# Patient Record
Sex: FEMALE | Race: OTHER | NOT HISPANIC OR LATINO
[De-identification: names, ages, dates, MRNs, and addresses within clinical notes are randomized per-mention and may not be internally consistent; named-entity substitution may affect disease eponyms.]

---

## 2017-01-03 VITALS — WEIGHT: 94 LBS | BODY MASS INDEX: 22.39 KG/M2

## 2017-01-03 RX ORDER — METHYLPREDNISOLONE 4 MG/1
4 TABLET ORAL
Qty: 1 | Refills: 0 | Status: ACTIVE | COMMUNITY
Start: 2017-01-03 | End: 1900-01-01

## 2017-01-04 ENCOUNTER — OTHER (OUTPATIENT)
Age: 82
End: 2017-01-04

## 2017-01-10 ENCOUNTER — OTHER (OUTPATIENT)
Age: 82
End: 2017-01-10

## 2017-01-10 VITALS
OXYGEN SATURATION: 90 % | RESPIRATION RATE: 16 BRPM | WEIGHT: 90.28 LBS | SYSTOLIC BLOOD PRESSURE: 109 MMHG | BODY MASS INDEX: 21.5 KG/M2 | DIASTOLIC BLOOD PRESSURE: 50 MMHG | HEART RATE: 100 BPM

## 2017-01-10 DIAGNOSIS — B37.0 CANDIDAL STOMATITIS: ICD-10-CM

## 2017-01-10 RX ORDER — FLUCONAZOLE 100 MG/1
100 TABLET ORAL
Qty: 15 | Refills: 0 | Status: ACTIVE | COMMUNITY
Start: 2017-01-10 | End: 1900-01-01

## 2017-01-11 LAB
ALBUMIN SERPL ELPH-MCNC: 3.7 G/DL
ALP BLD-CCNC: 85 U/L
ALT SERPL-CCNC: 23 U/L
ANION GAP SERPL CALC-SCNC: 16 MMOL/L
AST SERPL-CCNC: 17 U/L
BASOPHILS # BLD AUTO: 0.01 K/UL
BASOPHILS NFR BLD AUTO: 0.1 %
BILIRUB SERPL-MCNC: 0.6 MG/DL
BUN SERPL-MCNC: 30 MG/DL
CALCIUM SERPL-MCNC: 9.3 MG/DL
CHLORIDE SERPL-SCNC: 95 MMOL/L
CO2 SERPL-SCNC: 31 MMOL/L
CREAT SERPL-MCNC: 0.8 MG/DL
EOSINOPHIL # BLD AUTO: 0.01 K/UL
EOSINOPHIL NFR BLD AUTO: 0.1 %
GLUCOSE SERPL-MCNC: 140 MG/DL
HCT VFR BLD CALC: 49.1 %
HGB BLD-MCNC: 15.9 G/DL
IMM GRANULOCYTES NFR BLD AUTO: 0.3 %
LYMPHOCYTES # BLD AUTO: 0.71 K/UL
LYMPHOCYTES NFR BLD AUTO: 4.7 %
MAN DIFF?: NORMAL
MCHC RBC-ENTMCNC: 30.3 PG
MCHC RBC-ENTMCNC: 32.4 GM/DL
MCV RBC AUTO: 93.5 FL
MONOCYTES # BLD AUTO: 0.61 K/UL
MONOCYTES NFR BLD AUTO: 4 %
NEUTROPHILS # BLD AUTO: 13.8 K/UL
NEUTROPHILS NFR BLD AUTO: 90.8 %
PLATELET # BLD AUTO: 340 K/UL
POTASSIUM SERPL-SCNC: 3 MMOL/L
PROT SERPL-MCNC: 7 G/DL
RBC # BLD: 5.25 M/UL
RBC # FLD: 13.2 %
SODIUM SERPL-SCNC: 142 MMOL/L
WBC # FLD AUTO: 15.19 K/UL

## 2017-01-18 ENCOUNTER — INPATIENT (INPATIENT)
Facility: HOSPITAL | Age: 82
LOS: 7 days | Discharge: NURSING, REHAB OR LONG-TERM CARE | End: 2017-01-26
Attending: INTERNAL MEDICINE | Admitting: INTERNAL MEDICINE
Payer: MEDICARE

## 2017-01-18 VITALS
RESPIRATION RATE: 16 BRPM | SYSTOLIC BLOOD PRESSURE: 85 MMHG | DIASTOLIC BLOOD PRESSURE: 55 MMHG | HEART RATE: 64 BPM | TEMPERATURE: 98 F

## 2017-01-18 VITALS — BODY MASS INDEX: 21.58 KG/M2 | WEIGHT: 90.61 LBS

## 2017-01-18 DIAGNOSIS — C41.1 MALIGNANT NEOPLASM OF MANDIBLE: Chronic | ICD-10-CM

## 2017-01-18 DIAGNOSIS — Z98.89 OTHER SPECIFIED POSTPROCEDURAL STATES: Chronic | ICD-10-CM

## 2017-01-18 PROCEDURE — 99285 EMERGENCY DEPT VISIT HI MDM: CPT

## 2017-01-19 DIAGNOSIS — E89.0 POSTPROCEDURAL HYPOTHYROIDISM: Chronic | ICD-10-CM

## 2017-01-19 DIAGNOSIS — Z98.890 OTHER SPECIFIED POSTPROCEDURAL STATES: Chronic | ICD-10-CM

## 2017-01-19 DIAGNOSIS — E78.5 HYPERLIPIDEMIA, UNSPECIFIED: ICD-10-CM

## 2017-01-19 DIAGNOSIS — E87.6 HYPOKALEMIA: ICD-10-CM

## 2017-01-19 DIAGNOSIS — Z29.9 ENCOUNTER FOR PROPHYLACTIC MEASURES, UNSPECIFIED: ICD-10-CM

## 2017-01-19 DIAGNOSIS — C06.9 MALIGNANT NEOPLASM OF MOUTH, UNSPECIFIED: ICD-10-CM

## 2017-01-19 DIAGNOSIS — I48.91 UNSPECIFIED ATRIAL FIBRILLATION: ICD-10-CM

## 2017-01-19 DIAGNOSIS — R62.7 ADULT FAILURE TO THRIVE: ICD-10-CM

## 2017-01-19 DIAGNOSIS — I10 ESSENTIAL (PRIMARY) HYPERTENSION: ICD-10-CM

## 2017-01-19 LAB
ALBUMIN SERPL ELPH-MCNC: 2.5 G/DL — LOW (ref 3.3–5)
ALP SERPL-CCNC: 79 U/L — SIGNIFICANT CHANGE UP (ref 40–120)
ALT FLD-CCNC: 36 U/L — SIGNIFICANT CHANGE UP (ref 12–78)
ANION GAP SERPL CALC-SCNC: 12 MMOL/L — SIGNIFICANT CHANGE UP (ref 5–17)
ANION GAP SERPL CALC-SCNC: 15 MMOL/L — SIGNIFICANT CHANGE UP (ref 5–17)
ANION GAP SERPL CALC-SCNC: 9 MMOL/L — SIGNIFICANT CHANGE UP (ref 5–17)
APPEARANCE UR: CLEAR — SIGNIFICANT CHANGE UP
APTT BLD: 31 SEC — SIGNIFICANT CHANGE UP (ref 27.5–37.4)
AST SERPL-CCNC: 27 U/L — SIGNIFICANT CHANGE UP (ref 15–37)
BACTERIA # UR AUTO: ABNORMAL
BASOPHILS # BLD AUTO: 0 K/UL — SIGNIFICANT CHANGE UP (ref 0–0.2)
BASOPHILS NFR BLD AUTO: 0.5 % — SIGNIFICANT CHANGE UP (ref 0–2)
BILIRUB SERPL-MCNC: 0.6 MG/DL — SIGNIFICANT CHANGE UP (ref 0.2–1.2)
BILIRUB UR-MCNC: ABNORMAL
BUN SERPL-MCNC: 18 MG/DL — SIGNIFICANT CHANGE UP (ref 7–23)
BUN SERPL-MCNC: 26 MG/DL — HIGH (ref 7–23)
BUN SERPL-MCNC: 32 MG/DL — HIGH (ref 7–23)
CALCIUM SERPL-MCNC: 7.1 MG/DL — LOW (ref 8.5–10.1)
CALCIUM SERPL-MCNC: 7.3 MG/DL — LOW (ref 8.5–10.1)
CALCIUM SERPL-MCNC: 7.9 MG/DL — LOW (ref 8.5–10.1)
CHLORIDE SERPL-SCNC: 104 MMOL/L — SIGNIFICANT CHANGE UP (ref 96–108)
CHLORIDE SERPL-SCNC: 105 MMOL/L — SIGNIFICANT CHANGE UP (ref 96–108)
CHLORIDE SERPL-SCNC: 96 MMOL/L — SIGNIFICANT CHANGE UP (ref 96–108)
CK MB BLD-MCNC: 10 % — HIGH (ref 0–3.5)
CK MB CFR SERPL CALC: 6.4 NG/ML — HIGH (ref 0.5–3.6)
CK SERPL-CCNC: 64 U/L — SIGNIFICANT CHANGE UP (ref 26–192)
CO2 SERPL-SCNC: 26 MMOL/L — SIGNIFICANT CHANGE UP (ref 22–31)
CO2 SERPL-SCNC: 26 MMOL/L — SIGNIFICANT CHANGE UP (ref 22–31)
CO2 SERPL-SCNC: 27 MMOL/L — SIGNIFICANT CHANGE UP (ref 22–31)
COLOR SPEC: YELLOW — SIGNIFICANT CHANGE UP
CREAT SERPL-MCNC: 0.98 MG/DL — SIGNIFICANT CHANGE UP (ref 0.5–1.3)
CREAT SERPL-MCNC: 1.25 MG/DL — SIGNIFICANT CHANGE UP (ref 0.5–1.3)
CREAT SERPL-MCNC: 1.77 MG/DL — HIGH (ref 0.5–1.3)
DIFF PNL FLD: NEGATIVE — SIGNIFICANT CHANGE UP
EOSINOPHIL # BLD AUTO: 0 K/UL — SIGNIFICANT CHANGE UP (ref 0–0.5)
EOSINOPHIL NFR BLD AUTO: 0.3 % — SIGNIFICANT CHANGE UP (ref 0–6)
EPI CELLS # UR: SIGNIFICANT CHANGE UP
GLUCOSE SERPL-MCNC: 133 MG/DL — HIGH (ref 70–99)
GLUCOSE SERPL-MCNC: 168 MG/DL — HIGH (ref 70–99)
GLUCOSE SERPL-MCNC: 74 MG/DL — SIGNIFICANT CHANGE UP (ref 70–99)
GLUCOSE UR QL: NEGATIVE MG/DL — SIGNIFICANT CHANGE UP
HCT VFR BLD CALC: 36.9 % — SIGNIFICANT CHANGE UP (ref 34.5–45)
HCT VFR BLD CALC: 40.8 % — SIGNIFICANT CHANGE UP (ref 34.5–45)
HGB BLD-MCNC: 13.2 G/DL — SIGNIFICANT CHANGE UP (ref 11.5–15.5)
HGB BLD-MCNC: 14.6 G/DL — SIGNIFICANT CHANGE UP (ref 11.5–15.5)
HYALINE CASTS # UR AUTO: ABNORMAL /LPF
INR BLD: 1 RATIO — SIGNIFICANT CHANGE UP (ref 0.88–1.16)
KETONES UR-MCNC: ABNORMAL
LACTATE SERPL-SCNC: 1.9 MMOL/L — SIGNIFICANT CHANGE UP (ref 0.7–2)
LEUKOCYTE ESTERASE UR-ACNC: ABNORMAL
LYMPHOCYTES # BLD AUTO: 0.9 K/UL — LOW (ref 1–3.3)
LYMPHOCYTES # BLD AUTO: 9.9 % — LOW (ref 13–44)
MAGNESIUM SERPL-MCNC: 1.4 MG/DL — LOW (ref 1.8–2.4)
MAGNESIUM SERPL-MCNC: 1.8 MG/DL — SIGNIFICANT CHANGE UP (ref 1.8–2.4)
MCHC RBC-ENTMCNC: 30.2 PG — SIGNIFICANT CHANGE UP (ref 27–34)
MCHC RBC-ENTMCNC: 30.7 PG — SIGNIFICANT CHANGE UP (ref 27–34)
MCHC RBC-ENTMCNC: 35.7 GM/DL — SIGNIFICANT CHANGE UP (ref 32–36)
MCHC RBC-ENTMCNC: 35.7 GM/DL — SIGNIFICANT CHANGE UP (ref 32–36)
MCV RBC AUTO: 84.8 FL — SIGNIFICANT CHANGE UP (ref 80–100)
MCV RBC AUTO: 85.9 FL — SIGNIFICANT CHANGE UP (ref 80–100)
MONOCYTES # BLD AUTO: 0.3 K/UL — SIGNIFICANT CHANGE UP (ref 0–0.9)
MONOCYTES NFR BLD AUTO: 3.4 % — SIGNIFICANT CHANGE UP (ref 2–14)
NEUTROPHILS # BLD AUTO: 7.7 K/UL — HIGH (ref 1.8–7.4)
NEUTROPHILS NFR BLD AUTO: 85.9 % — HIGH (ref 43–77)
NITRITE UR-MCNC: NEGATIVE — SIGNIFICANT CHANGE UP
NT-PROBNP SERPL-SCNC: 1128 PG/ML — HIGH (ref 0–450)
PH UR: 5 — SIGNIFICANT CHANGE UP (ref 4.8–8)
PHOSPHATE SERPL-MCNC: 2.8 MG/DL — SIGNIFICANT CHANGE UP (ref 2.5–4.5)
PLATELET # BLD AUTO: 219 K/UL — SIGNIFICANT CHANGE UP (ref 150–400)
PLATELET # BLD AUTO: 261 K/UL — SIGNIFICANT CHANGE UP (ref 150–400)
POTASSIUM SERPL-MCNC: 2.8 MMOL/L — CRITICAL LOW (ref 3.5–5.3)
POTASSIUM SERPL-MCNC: 2.9 MMOL/L — CRITICAL LOW (ref 3.5–5.3)
POTASSIUM SERPL-MCNC: 3.1 MMOL/L — LOW (ref 3.5–5.3)
POTASSIUM SERPL-SCNC: 2.8 MMOL/L — CRITICAL LOW (ref 3.5–5.3)
POTASSIUM SERPL-SCNC: 2.9 MMOL/L — CRITICAL LOW (ref 3.5–5.3)
POTASSIUM SERPL-SCNC: 3.1 MMOL/L — LOW (ref 3.5–5.3)
PROT SERPL-MCNC: 6.1 GM/DL — SIGNIFICANT CHANGE UP (ref 6–8.3)
PROT UR-MCNC: 30 MG/DL
PROTHROM AB SERPL-ACNC: 11.2 SEC — SIGNIFICANT CHANGE UP (ref 10–13.1)
RBC # BLD: 4.29 M/UL — SIGNIFICANT CHANGE UP (ref 3.8–5.2)
RBC # BLD: 4.82 M/UL — SIGNIFICANT CHANGE UP (ref 3.8–5.2)
RBC # FLD: 11.8 % — SIGNIFICANT CHANGE UP (ref 11–15)
RBC # FLD: 11.9 % — SIGNIFICANT CHANGE UP (ref 11–15)
RBC CASTS # UR COMP ASSIST: SIGNIFICANT CHANGE UP /HPF (ref 0–4)
SODIUM SERPL-SCNC: 137 MMOL/L — SIGNIFICANT CHANGE UP (ref 135–145)
SODIUM SERPL-SCNC: 141 MMOL/L — SIGNIFICANT CHANGE UP (ref 135–145)
SODIUM SERPL-SCNC: 142 MMOL/L — SIGNIFICANT CHANGE UP (ref 135–145)
SP GR SPEC: 1.01 — SIGNIFICANT CHANGE UP (ref 1.01–1.02)
TROPONIN I SERPL-MCNC: 0.02 NG/ML — SIGNIFICANT CHANGE UP (ref 0.01–0.04)
UROBILINOGEN FLD QL: 1 MG/DL
WBC # BLD: 8.9 K/UL — SIGNIFICANT CHANGE UP (ref 3.8–10.5)
WBC # BLD: 9.3 K/UL — SIGNIFICANT CHANGE UP (ref 3.8–10.5)
WBC # FLD AUTO: 8.9 K/UL — SIGNIFICANT CHANGE UP (ref 3.8–10.5)
WBC # FLD AUTO: 9.3 K/UL — SIGNIFICANT CHANGE UP (ref 3.8–10.5)
WBC UR QL: SIGNIFICANT CHANGE UP

## 2017-01-19 PROCEDURE — 93970 EXTREMITY STUDY: CPT | Mod: 26

## 2017-01-19 PROCEDURE — 99223 1ST HOSP IP/OBS HIGH 75: CPT

## 2017-01-19 PROCEDURE — 71010: CPT | Mod: 26

## 2017-01-19 RX ORDER — HEPARIN SODIUM 5000 [USP'U]/ML
5000 INJECTION INTRAVENOUS; SUBCUTANEOUS EVERY 12 HOURS
Qty: 0 | Refills: 0 | Status: DISCONTINUED | OUTPATIENT
Start: 2017-01-19 | End: 2017-01-26

## 2017-01-19 RX ORDER — PIPERACILLIN AND TAZOBACTAM 4; .5 G/20ML; G/20ML
3.38 INJECTION, POWDER, LYOPHILIZED, FOR SOLUTION INTRAVENOUS ONCE
Qty: 0 | Refills: 0 | Status: COMPLETED | OUTPATIENT
Start: 2017-01-19 | End: 2017-01-19

## 2017-01-19 RX ORDER — SODIUM CHLORIDE 9 MG/ML
1000 INJECTION INTRAMUSCULAR; INTRAVENOUS; SUBCUTANEOUS
Qty: 0 | Refills: 0 | Status: DISCONTINUED | OUTPATIENT
Start: 2017-01-19 | End: 2017-01-19

## 2017-01-19 RX ORDER — POTASSIUM CHLORIDE 20 MEQ
40 PACKET (EA) ORAL ONCE
Qty: 0 | Refills: 0 | Status: COMPLETED | OUTPATIENT
Start: 2017-01-19 | End: 2017-01-19

## 2017-01-19 RX ORDER — SODIUM CHLORIDE 9 MG/ML
3 INJECTION INTRAMUSCULAR; INTRAVENOUS; SUBCUTANEOUS ONCE
Qty: 0 | Refills: 0 | Status: COMPLETED | OUTPATIENT
Start: 2017-01-19 | End: 2017-01-19

## 2017-01-19 RX ORDER — SODIUM CHLORIDE 9 MG/ML
1000 INJECTION INTRAMUSCULAR; INTRAVENOUS; SUBCUTANEOUS ONCE
Qty: 0 | Refills: 0 | Status: COMPLETED | OUTPATIENT
Start: 2017-01-19 | End: 2017-01-19

## 2017-01-19 RX ORDER — POTASSIUM CHLORIDE 20 MEQ
10 PACKET (EA) ORAL ONCE
Qty: 0 | Refills: 0 | Status: COMPLETED | OUTPATIENT
Start: 2017-01-19 | End: 2017-01-19

## 2017-01-19 RX ORDER — MAGNESIUM SULFATE 500 MG/ML
1 VIAL (ML) INJECTION ONCE
Qty: 0 | Refills: 0 | Status: COMPLETED | OUTPATIENT
Start: 2017-01-19 | End: 2017-01-19

## 2017-01-19 RX ORDER — POTASSIUM CHLORIDE 20 MEQ
40 PACKET (EA) ORAL EVERY 4 HOURS
Qty: 0 | Refills: 0 | Status: DISCONTINUED | OUTPATIENT
Start: 2017-01-19 | End: 2017-01-19

## 2017-01-19 RX ORDER — POTASSIUM CHLORIDE 20 MEQ
40 PACKET (EA) ORAL ONCE
Qty: 0 | Refills: 0 | Status: DISCONTINUED | OUTPATIENT
Start: 2017-01-19 | End: 2017-01-19

## 2017-01-19 RX ORDER — SODIUM CHLORIDE 9 MG/ML
1000 INJECTION, SOLUTION INTRAVENOUS
Qty: 0 | Refills: 0 | Status: DISCONTINUED | OUTPATIENT
Start: 2017-01-19 | End: 2017-01-23

## 2017-01-19 RX ORDER — POTASSIUM CHLORIDE 20 MEQ
10 PACKET (EA) ORAL
Qty: 0 | Refills: 0 | Status: COMPLETED | OUTPATIENT
Start: 2017-01-19 | End: 2017-01-19

## 2017-01-19 RX ADMIN — Medication 100 MILLIEQUIVALENT(S): at 02:09

## 2017-01-19 RX ADMIN — Medication 40 MILLIEQUIVALENT(S): at 22:12

## 2017-01-19 RX ADMIN — PIPERACILLIN AND TAZOBACTAM 200 GRAM(S): 4; .5 INJECTION, POWDER, LYOPHILIZED, FOR SOLUTION INTRAVENOUS at 01:39

## 2017-01-19 RX ADMIN — SODIUM CHLORIDE 1000 MILLILITER(S): 9 INJECTION INTRAMUSCULAR; INTRAVENOUS; SUBCUTANEOUS at 01:38

## 2017-01-19 RX ADMIN — Medication 100 MILLIEQUIVALENT(S): at 15:46

## 2017-01-19 RX ADMIN — Medication 100 MILLIEQUIVALENT(S): at 12:59

## 2017-01-19 RX ADMIN — HEPARIN SODIUM 5000 UNIT(S): 5000 INJECTION INTRAVENOUS; SUBCUTANEOUS at 06:08

## 2017-01-19 RX ADMIN — SODIUM CHLORIDE 1000 MILLILITER(S): 9 INJECTION INTRAMUSCULAR; INTRAVENOUS; SUBCUTANEOUS at 01:15

## 2017-01-19 RX ADMIN — HEPARIN SODIUM 5000 UNIT(S): 5000 INJECTION INTRAVENOUS; SUBCUTANEOUS at 17:33

## 2017-01-19 RX ADMIN — Medication 100 GRAM(S): at 11:27

## 2017-01-19 RX ADMIN — SODIUM CHLORIDE 150 MILLILITER(S): 9 INJECTION INTRAMUSCULAR; INTRAVENOUS; SUBCUTANEOUS at 03:25

## 2017-01-19 RX ADMIN — SODIUM CHLORIDE 100 MILLILITER(S): 9 INJECTION, SOLUTION INTRAVENOUS at 22:13

## 2017-01-19 RX ADMIN — SODIUM CHLORIDE 3 MILLILITER(S): 9 INJECTION INTRAMUSCULAR; INTRAVENOUS; SUBCUTANEOUS at 00:35

## 2017-01-19 NOTE — H&P ADULT. - RS GEN PE MLT RESP DETAILS PC
good air movement/clear to auscultation bilaterally/no chest wall tenderness/airway patent/breath sounds equal/respirations non-labored

## 2017-01-19 NOTE — SWALLOW BEDSIDE ASSESSMENT ADULT - ORAL PHASE
Decreased anterior-posterior movement of the bolus/Delayed oral transit time/Lingual stasis Decreased anterior-posterior movement of the bolus/Delayed oral transit time Delayed oral transit time Decreased anterior-posterior movement of the bolus/Lingual stasis/Delayed oral transit time

## 2017-01-19 NOTE — PHYSICAL THERAPY INITIAL EVALUATION ADULT - MODIFIED CLINICAL TEST OF SENSORY INTEGRATION IN BALANCE TEST
Barthel Index: Feeding Score _10__, Bathing Score __0_, Grooming Score _0__, Dressing Score _0__, Bowels Score _0__, Bladder Score ___,0 Toilet Score _0, Transfers Score 0___, Mobility Score _0__, Stairs Score 0___,     Total Score _10__

## 2017-01-19 NOTE — ED PROVIDER NOTE - CONSTITUTIONAL, MLM
normal... Well appearing, well nourished, awake, alert, oriented to person, place, time/situation and in no apparent distress. ++ dry MM

## 2017-01-19 NOTE — H&P ADULT. - PROBLEM SELECTOR PLAN 1
Admit, D5NS, NPO until after speech and swallow evaluation complete, may need nutrition supplementation or nutrition consult, PT consult

## 2017-01-19 NOTE — SWALLOW BEDSIDE ASSESSMENT ADULT - DIET PRIOR TO ADMI
pt stated "I can drink water", and "I eat tuna fish, but I can't". additionally when the pt was offered the soft solid ( saltine cracker) she stated "the doctor told me not to"

## 2017-01-19 NOTE — SWALLOW BEDSIDE ASSESSMENT ADULT - SWALLOW EVAL: PATIENT/FAMILY GOALS STATEMENT
pt stated "I can't eat, I can't taste anything" pt stated "I can't eat, I can't taste anything" and "I lost a lot of weight".

## 2017-01-19 NOTE — DIETITIAN INITIAL EVALUATION ADULT. - NS AS NUTRI INTERV ENTERAL NUTRITION
Insert enteral feeding tube/Consider NGT or PEG for nutrition intervention start Jevity 1.2 @ 30ml/hr to goal rate 45ml/rb=[id number] & 60gm protein/Concentration/Rate/Volume/Route/Composition

## 2017-01-19 NOTE — ED PROVIDER NOTE - OBJECTIVE STATEMENT
89yo female with pmh HTN, HL, oral cancer s/p radiation today, presents with dec appetite for a while and inc weakness. Today pt states inc weakness and dizzy.  Pt also in MRN [id number]    No fever/chills. No photophobia/eye pain/changes in vision, No ear pain/sore throat/dysphagia, No chest pain/palpitations. No SOB/cough/stridor. No abdominal pain, N/V/D, no black/bloody bm. No dysuria/frequency/discharge, No headache. + Dizziness.  No rash.  No numbness/tingling/weakness. 89yo female with pmh HTN, HL, oral cancer s/p radiation today, presents with dec appetite for a while and inc weakness. denies cp. pt completed 5 courses of radiation. Today pt states inc weakness and dizzy.  Pt also in MRN [id number]. granddaughter: [phone]    No fever/chills. No photophobia/eye pain/changes in vision, No ear pain/sore throat/dysphagia, No chest pain/palpitations. No SOB/cough/stridor. No abdominal pain, N/V/D, no black/bloody bm. No dysuria/frequency/discharge, No headache. + Dizziness.  No rash.  No numbness/tingling/weakness.

## 2017-01-19 NOTE — SWALLOW BEDSIDE ASSESSMENT ADULT - SLP PERTINENT HISTORY OF CURRENT PROBLEM
89 year old female with PMH of oral cancer s/p radiation therapy yesterday (has received 5 treatments to date), HTN, HLD and PSH of oral surgery 2/2 to cancer, partial thyroidectomy, and right leg surgery presents to ED complaining of weakness and decreased appetitie. Patient states that due to the radiation she no longer can taste any of her food so she eats very little. She states that she wants nutrition through her veins, she does not want to eat and does not was a NGT for feeding. Admits to 1 episode of diarrhea 2 days ago. Denies fever, chills, sob, chest pain, palpitations, nausea, vomiting, and dysuria. Admits to using a walker for ambulation at home. Patient states she has a home health aid 7 days a week for 6 hours a day, but she lives alone. Patient does not have a list of her medications with her, and is unsure if she has had Afib in the past.

## 2017-01-19 NOTE — ED PROVIDER NOTE - MEDICAL DECISION MAKING DETAILS
Pt initially hypotensive and afebrile, however rectal temp is wnl and BP normalise. potassium replensihed. d/w dr. malhotra for admission.

## 2017-01-19 NOTE — ED ADULT NURSE NOTE - OBJECTIVE STATEMENT
Pt aox3, presented for weakness. Pt denies chest pain, and sob. Pt had radiation therapy yesterday. Pmhx  is mouth cancer

## 2017-01-19 NOTE — ED PROVIDER NOTE - CARE PLAN
Principal Discharge DX:	Hypotensive episode  Secondary Diagnosis:	Hypokalemia  Secondary Diagnosis:	Failure to thrive in adult Principal Discharge DX:	Failure to thrive in adult  Secondary Diagnosis:	Hypokalemia

## 2017-01-19 NOTE — SWALLOW BEDSIDE ASSESSMENT ADULT - SLP GENERAL OBSERVATIONS
pt seen bedside alert and oriented x4. pt responded to questions, verbalized wants and needs and she was able to follow directions. neighbor at the bedside.

## 2017-01-19 NOTE — DIETITIAN INITIAL EVALUATION ADULT. - OTHER INFO
Pt seen for Failure to thrive.  Pt  lives alone with HHA 6 hours daily.  Pt's neighbor does food shopping. Pt states she is unable to tolerate Ensure + or Boost due to causing diarrhea.  Pt with difficulty consuming po diet to "no taste" & increased difficulty chewing & swallowing x 3 weeks. Pt with mouth cancer continues radiation & presents with increased weakness due to unable to eat.  UBW not available.

## 2017-01-19 NOTE — SWALLOW BEDSIDE ASSESSMENT ADULT - COMMENTS
CXR 1/19/17 MPRESSION:Fibrotic changes both lungs. Of note is an intrathoracic stomach versus marked elevation left hemidiaphragm. Left lung volume loss.    pt's Therapeutic Radiologist Dr Bolivar-[phone] CXR 1/19/17 IMPRESSION:Fibrotic changes both lungs. Of note is an intrathoracic stomach versus marked elevation left hemidiaphragm. Left lung volume loss.    pt's Therapeutic Radiologist Dr Bolivar at [facility]/ [place]- [phone]

## 2017-01-19 NOTE — SWALLOW BEDSIDE ASSESSMENT ADULT - SWALLOW EVAL: DIAGNOSIS
pt presented with oropharyngeal dysphagia marked by reduced labial seal causing anterior loss, overall decreased ROM of tongue/jaw/lips causing reduced mastication, slow oral transport, and reduced bolus formation, suspect timely swallow with adequate laryngeal elevation. no overt signs of aspiration

## 2017-01-19 NOTE — DIETITIAN INITIAL EVALUATION ADULT. - PERTINENT LABORATORY DATA
01-19 Na 142 mmol/L Glu 74 mg/dL K+ 2.9 mmol/L<LL> Cr  1.25 mg/dL BUN 26 mg/dL<H> Phos 2.8 mg/dL Alb 2.5(1/19)   PAB n/a   Hgb 13.2 g/dL Hct 36.9 %

## 2017-01-19 NOTE — H&P ADULT. - PROBLEM SELECTOR PLAN 6
heparin, Venous duplex of lower extremities due to swelling and tenderness most likely due to fluid overload

## 2017-01-19 NOTE — DIETITIAN INITIAL EVALUATION ADULT. - NS AS NUTRI INTERV COLLABORAT
Collaboration with other providers/Swallow evaluation pending to assess need to consume po Recommend MD order 3 day calorie count/Collaboration with other providers

## 2017-01-19 NOTE — CHART NOTE - NSCHARTNOTEFT_GEN_A_CORE
Upon Nutritional Assessment by the Registered Dietitian your patient was determined to meet criteria / has evidence of the following diagnosis/diagnoses:          [ ]  Mild Protein Calorie Malnutrition        [ ]  Moderate Protein Calorie Malnutrition        [x ] Severe Protein Calorie Malnutrition        [ ] Unspecified Protein Calorie Malnutrition        [ ] Underweight / BMI <19        [ ] Morbid Obesity / BMI > 40      Findings as based on:  •  Comprehensive nutrition assessment and consultation  •  Calorie counts (nutrient intake analysis)  •  Food acceptance and intake status from observations by staff  •  Follow up  •  Patient education  •  Intervention secondary to interdisciplinary rounds  •   concerns      Treatment:    The following diet has been recommended:  Puree/thin liquids/Ensure Clear 3 x day    PROVIDER Section:     By signing this assessment you are acknowledging and agree with the diagnosis/diagnoses assigned by the Registered Dietitian    Comments:

## 2017-01-19 NOTE — CHART NOTE - NSCHARTNOTEFT_GEN_A_CORE
MEDICINE ATTENDING  Pt seen/examined; H&P, labs, imaging reviewed; 89F, HTN, HL, hx oral cancer/resection/radiation s/p radiation tx yesterday, adm w/weakness, anorexia 2/2 difficulty tasting food.  Pt for speech/swallow eval, repletion of electrolytes, nutrition eval.  Diagnoses, management plan discussed at length w/pt; all questions answered. MEDICINE ATTENDING  Pt seen/examined; H&P, labs, imaging reviewed; 89F, HTN, HL, hx oral cancer/resection/radiation s/p radiation tx yesterday, adm w/weakness, anorexia 2/2 difficulty tasting food.  Pt for speech/swallow eval, repletion of electrolytes, nutrition eval/calorie count, possible PEG?.  Diagnoses, management plan discussed at length w/pt; all questions answered.

## 2017-01-19 NOTE — DIETITIAN INITIAL EVALUATION ADULT. - PHYSICAL APPEARANCE
BMI=22.6; no edema; Nutrition focused physical exam conducted ; found signs of malnutrition [ ]absent [x ]present.  Subcutaneous fat loss: [moderate ] Orbital fat pads region, [WNL ]Buccal fat region, [moderate ]Triceps region,  [moderate ]Ribs region.  Muscle wasting: [moderate ]Temples region, [WNL ]Clavicle region, [moderate ]Shoulder region, [unable ]Scapula region, [moderate ]Interosseous region,  [moderate ]thigh region, [edema ]Calf region/underweight

## 2017-01-19 NOTE — SWALLOW BEDSIDE ASSESSMENT ADULT - ASR SWALLOW LINGUAL MOBILITY
impaired left lateral movement/impaired right lateral movement/impaired protrusion/impaired anterior elevation

## 2017-01-19 NOTE — H&P ADULT. - HISTORY OF PRESENT ILLNESS
89 year old female with PMH of oral cancer s/p radiation therapy yesterday (has received 5 treatments to date), HTN, HLD and PSH of oral surgery 2/2 to cancer, partial thyroidectomy, and right leg surgery presents to ED complaining of weakness and decreased appetitie. Patient states that due to the radiation she no longer can taste any of her food so she eats very little. She states that she wants nutrition through her veins, she does not want to eat and does not was a NGT for feeding. Admits to 1 episode of diarrhea 2 days ago. Denies fever, chills, sob, chest pain, palpitations, nausea, vomiting, and dysuria. Admits to using a walker for ambulation at home. Patient states she has a home health aid 7 days a week for 6 hours a day, but she lives alone. 89 year old female with PMH of oral cancer s/p radiation therapy yesterday (has received 5 treatments to date), HTN, HLD and PSH of oral surgery 2/2 to cancer, partial thyroidectomy, and right leg surgery presents to ED complaining of weakness and decreased appetitie. Patient states that due to the radiation she no longer can taste any of her food so she eats very little. She states that she wants nutrition through her veins, she does not want to eat and does not was a NGT for feeding. Admits to 1 episode of diarrhea 2 days ago. Denies fever, chills, sob, chest pain, palpitations, nausea, vomiting, and dysuria. Admits to using a walker for ambulation at home. Patient states she has a home health aid 7 days a week for 6 hours a day, but she lives alone. Patient does not have a list of her medications with her, and is unsure if she has had Afib in the past.

## 2017-01-19 NOTE — SWALLOW BEDSIDE ASSESSMENT ADULT - ORAL PREPARATORY PHASE
Refuses to accept bolus into oral cavity/Reduced oral grading small bite size-/Decreased mastication ability Anterior loss of bolus/inconsistent small bite size- and pt with munching pattern/Decreased mastication ability Anterior loss of bolus/inconsistent anterior loss

## 2017-01-19 NOTE — DIETITIAN INITIAL EVALUATION ADULT. - NUTRITIONGOAL OUTCOME1
Pt to meet >75% energy & protein via TF or po pending swallow evaluation & maintain stable wt+/-2# Pt to meet >75% meals/supplements & maintain stable wt+/-2#

## 2017-01-19 NOTE — DIETITIAN INITIAL EVALUATION ADULT. - NUTRITION INTERVENTION
Enteral Nutrition/Collaboration and Referral of Nutrition Care Medical Food Supplements/Meals and Snack/Enteral Nutrition Meals and Snack/Collaboration and Referral of Nutrition Care/Enteral Nutrition/Medical Food Supplements/Vitamin

## 2017-01-19 NOTE — H&P ADULT. - PROBLEM SELECTOR PLAN 3
patient currently hypotensive. On IVF, will monitor vital sings. Call PMD to find out home medications

## 2017-01-19 NOTE — SWALLOW BEDSIDE ASSESSMENT ADULT - SPECIFY REASON(S)
pt admitted failure to thrive - diagnosis oral CA. she reported anterior loss, "I chew and chew" and pain in her esophagus while eating "her esophagus is high". pt admitted failure to thrive - diagnosis oral CA. pt reported anterior loss with thin, "I chew and chew" and pain in her esophagus while eating "her esophagus is high".

## 2017-01-19 NOTE — SWALLOW BEDSIDE ASSESSMENT ADULT - SWALLOW EVAL: RECOMMENDED FEEDING/EATING TECHNIQUES
check mouth frequently for oral residue/pocketing/crush medication (when feasible)/small sips/bites/small meals through the day/allow for swallow between intakes

## 2017-01-20 DIAGNOSIS — R13.10 DYSPHAGIA, UNSPECIFIED: ICD-10-CM

## 2017-01-20 DIAGNOSIS — R63.0 ANOREXIA: ICD-10-CM

## 2017-01-20 DIAGNOSIS — E83.42 HYPOMAGNESEMIA: ICD-10-CM

## 2017-01-20 DIAGNOSIS — R33.9 RETENTION OF URINE, UNSPECIFIED: ICD-10-CM

## 2017-01-20 LAB
ANION GAP SERPL CALC-SCNC: 8 MMOL/L — SIGNIFICANT CHANGE UP (ref 5–17)
ANION GAP SERPL CALC-SCNC: 9 MMOL/L — SIGNIFICANT CHANGE UP (ref 5–17)
APPEARANCE UR: CLEAR — SIGNIFICANT CHANGE UP
BILIRUB UR-MCNC: NEGATIVE — SIGNIFICANT CHANGE UP
BUN SERPL-MCNC: 10 MG/DL — SIGNIFICANT CHANGE UP (ref 7–23)
BUN SERPL-MCNC: 12 MG/DL — SIGNIFICANT CHANGE UP (ref 7–23)
CALCIUM SERPL-MCNC: 7.5 MG/DL — LOW (ref 8.5–10.1)
CALCIUM SERPL-MCNC: 7.7 MG/DL — LOW (ref 8.5–10.1)
CHLORIDE SERPL-SCNC: 104 MMOL/L — SIGNIFICANT CHANGE UP (ref 96–108)
CHLORIDE SERPL-SCNC: 108 MMOL/L — SIGNIFICANT CHANGE UP (ref 96–108)
CO2 SERPL-SCNC: 27 MMOL/L — SIGNIFICANT CHANGE UP (ref 22–31)
CO2 SERPL-SCNC: 29 MMOL/L — SIGNIFICANT CHANGE UP (ref 22–31)
COLOR SPEC: YELLOW — SIGNIFICANT CHANGE UP
CREAT SERPL-MCNC: 0.64 MG/DL — SIGNIFICANT CHANGE UP (ref 0.5–1.3)
CREAT SERPL-MCNC: 0.67 MG/DL — SIGNIFICANT CHANGE UP (ref 0.5–1.3)
CULTURE RESULTS: NO GROWTH — SIGNIFICANT CHANGE UP
DIFF PNL FLD: NEGATIVE — SIGNIFICANT CHANGE UP
GLUCOSE SERPL-MCNC: 136 MG/DL — HIGH (ref 70–99)
GLUCOSE SERPL-MCNC: 98 MG/DL — SIGNIFICANT CHANGE UP (ref 70–99)
GLUCOSE UR QL: 50 MG/DL
KETONES UR-MCNC: NEGATIVE — SIGNIFICANT CHANGE UP
LEUKOCYTE ESTERASE UR-ACNC: NEGATIVE — SIGNIFICANT CHANGE UP
MAGNESIUM SERPL-MCNC: 1.5 MG/DL — LOW (ref 1.8–2.4)
NITRITE UR-MCNC: NEGATIVE — SIGNIFICANT CHANGE UP
PH UR: 6 — SIGNIFICANT CHANGE UP (ref 4.8–8)
POTASSIUM SERPL-MCNC: 2.9 MMOL/L — CRITICAL LOW (ref 3.5–5.3)
POTASSIUM SERPL-MCNC: 4.8 MMOL/L — SIGNIFICANT CHANGE UP (ref 3.5–5.3)
POTASSIUM SERPL-SCNC: 2.9 MMOL/L — CRITICAL LOW (ref 3.5–5.3)
POTASSIUM SERPL-SCNC: 4.8 MMOL/L — SIGNIFICANT CHANGE UP (ref 3.5–5.3)
PROT UR-MCNC: NEGATIVE MG/DL — SIGNIFICANT CHANGE UP
SODIUM SERPL-SCNC: 142 MMOL/L — SIGNIFICANT CHANGE UP (ref 135–145)
SODIUM SERPL-SCNC: 143 MMOL/L — SIGNIFICANT CHANGE UP (ref 135–145)
SP GR SPEC: 1.01 — SIGNIFICANT CHANGE UP (ref 1.01–1.02)
SPECIMEN SOURCE: SIGNIFICANT CHANGE UP
UROBILINOGEN FLD QL: NEGATIVE MG/DL — SIGNIFICANT CHANGE UP

## 2017-01-20 PROCEDURE — 99233 SBSQ HOSP IP/OBS HIGH 50: CPT

## 2017-01-20 RX ORDER — POTASSIUM CHLORIDE 20 MEQ
40 PACKET (EA) ORAL EVERY 4 HOURS
Qty: 0 | Refills: 0 | Status: DISCONTINUED | OUTPATIENT
Start: 2017-01-20 | End: 2017-01-20

## 2017-01-20 RX ORDER — POTASSIUM CHLORIDE 20 MEQ
10 PACKET (EA) ORAL ONCE
Qty: 0 | Refills: 0 | Status: DISCONTINUED | OUTPATIENT
Start: 2017-01-20 | End: 2017-01-20

## 2017-01-20 RX ORDER — POTASSIUM CHLORIDE 20 MEQ
10 PACKET (EA) ORAL ONCE
Qty: 0 | Refills: 0 | Status: COMPLETED | OUTPATIENT
Start: 2017-01-20 | End: 2017-01-20

## 2017-01-20 RX ORDER — MAGNESIUM SULFATE 500 MG/ML
2 VIAL (ML) INJECTION ONCE
Qty: 0 | Refills: 0 | Status: COMPLETED | OUTPATIENT
Start: 2017-01-20 | End: 2017-01-20

## 2017-01-20 RX ADMIN — Medication 50 GRAM(S): at 08:35

## 2017-01-20 RX ADMIN — Medication 40 MILLIEQUIVALENT(S): at 13:37

## 2017-01-20 RX ADMIN — HEPARIN SODIUM 5000 UNIT(S): 5000 INJECTION INTRAVENOUS; SUBCUTANEOUS at 17:23

## 2017-01-20 RX ADMIN — Medication 100 MILLIEQUIVALENT(S): at 17:20

## 2017-01-20 RX ADMIN — Medication 100 MILLIEQUIVALENT(S): at 11:40

## 2017-01-20 RX ADMIN — HEPARIN SODIUM 5000 UNIT(S): 5000 INJECTION INTRAVENOUS; SUBCUTANEOUS at 05:42

## 2017-01-20 RX ADMIN — SODIUM CHLORIDE 100 MILLILITER(S): 9 INJECTION, SOLUTION INTRAVENOUS at 11:40

## 2017-01-20 RX ADMIN — Medication 40 MILLIEQUIVALENT(S): at 08:34

## 2017-01-20 NOTE — CONSULT NOTE ADULT - CONSULT REASON
acute urinary retention. Pt was unable to void and was voiding every 10-15 minutes, necessitating Lyons Catheter

## 2017-01-20 NOTE — CONSULT NOTE ADULT - SUBJECTIVE AND OBJECTIVE BOX
HPI:  89 year old female with PMH of oral cancer s/p radiation therapy yesterday (has received 5 treatments to date), HTN, HLD and PSH of oral surgery 2/2 to cancer, partial thyroidectomy, and right leg surgery presents to ED complaining of weakness and decreased appetitie. Patient states that due to the radiation she no longer can taste any of her food so she eats very little. She states that she wants nutrition through her veins, she does not want to eat and does not was a NGT for feeding. Admits to 1 episode of diarrhea 2 days ago. Denies fever, chills, sob, chest pain, palpitations, nausea, vomiting, and dysuria. Admits to using a walker for ambulation at home. Patient states she has a home health aid 7 days a week for 6 hours a day, but she lives alone. Patient does not have a list of her medications with her, and is unsure if she has had Afib in the past. (2017 05:25)      PAST MEDICAL & SURGICAL HISTORY:  Hyperlipidemia  HTN (hypertension)  Mouth cancer  H/O partial thyroidectomy  H/O oral surgery      Allergies    No Known Allergies    Intolerances        SOCIAL HISTORY  Lives alone    FAMILY HISTORY:  No pertinent family history in first degree relatives      MEDICATIONS  (STANDING):  dextrose 5% + sodium chloride 0.9%. 1000milliLiter(s) IV Continuous <Continuous>  heparin  Injectable 5000Unit(s) SubCutaneous every 12 hours  potassium chloride   Powder 40milliEquivalent(s) Oral every 4 hours    MEDICATIONS  (PRN):      ROS:    General:  No wt loss, fevers, chills, night sweats  Eyes:  Good vision, no reported pain  ENT:  No sore throat, pain, runny nose, dysphagia.            no taste of food. radiation for Ca of Mouth  CV:  No pain, palpitations hypo/hypertension  Resp:  No dyspnea, cough, tachypnea, wheezing  GI:  No pain, nausea, vomiting, diarrhea, constipation  :  has Lyons Catheter for AUR  Muscle:  No pain, weakness  Neuro:  No weakness, tingling, memory problems  Psych:  No fatigue, insomnia, mood problems, depression  Endocrine:  No polyuria, polydypsia, cold/heat intolerance  Heme:  No petechiae, ecchymosis, easy bruisability  Skin:  No rash, tattoos, scars, edema      Physical Exam:    Vital Signs:  Vital Signs Last 24 Hrs  T(C): 36.7, Max: 36.7 ( @ 23:16)  T(F): 98, Max: 98 ( @ 23:16)  HR: 91 (68 - 91)  BP: 140/90 (101/55 - 913/66)  BP(mean): --  RR: 17 (15 - 17)  SpO2: 95% (95% - 99%)  Daily     Daily   I&O's Summary      General:  Appears stated age,  well-nourished, no distress  HEENT:  NC/AT, patent nares w/ pink mucosa, OP moist and pink,  PERRL, EOMI, conjunctivae clear, no thyromegaly, nodules, adenopathy, no JVD  Chest:  Full & symmetric excursion, no increased effort.   Cardiovascular:  Regular rhythm, S1, S2,   Abdomen:  Soft, non-tender, non-distended, normoactive bowel sounds.  Genitalia: Lyons catheter in place  Rectal Examination: Deferred.  Extremities:  no edema, pedal pulsation are present, no calf tenderness.  Skin:  No rash/erythema/ecchymoses/petechiae/wounds/abscess/warm/dry  Musculoskeletal:  Full ROM in all joints w/o swelling/tenderness/effusion  Neuro/Psych:  Alert, oriented, normal and grossly intact and symmetrical.      LABS:                        13.2   9.3   )-----------( 219      ( 2017 08:16 )             36.9     2017 07:05    142    |  104    |  12     ----------------------------<  98     2.9     |  29     |  0.67     Ca    7.5        2017 07:05  Phos  2.8       2017 08:16  Mg     1.5       2017 07:05    TPro  6.1    /  Alb  2.5    /  TBili  0.6    /  DBili  x      /  AST  27     /  ALT  36     /  AlkPhos  79     2017 00:41    PT/INR - ( 2017 00:41 )   PT: 11.2 sec;   INR: 1.00 ratio         PTT - ( 2017 00:41 )  PTT:31.0 sec  Urinalysis Basic - ( 2017 11:58 )    Color: Yellow / Appearance: Clear / S.010 / pH: x  Gluc: x / Ketone: Negative  / Bili: Negative / Urobili: Negative mg/dL   Blood: x / Protein: Negative mg/dL / Nitrite: Negative   Leuk Esterase: Negative / RBC: x / WBC x   Sq Epi: x / Non Sq Epi: x / Bacteria: x        RADIOLOGY & ADDITIONAL STUDIES:

## 2017-01-20 NOTE — PROGRESS NOTE ADULT - SUBJECTIVE AND OBJECTIVE BOX
CC/F/U for:    INTERVAL HPI/OVERNIGHT EVENTS:  Pt seen and examined at bedside.     Allergies/Intolerance: No Known Allergies      MEDICATIONS  (STANDING):  dextrose 5% + sodium chloride 0.9%. 1000milliLiter(s) IV Continuous <Continuous>  heparin  Injectable 5000Unit(s) SubCutaneous every 12 hours  potassium chloride   Powder 40milliEquivalent(s) Oral every 4 hours  potassium chloride  10 mEq/100 mL IVPB 10milliEquivalent(s) IV Intermittent once    MEDICATIONS  (PRN):        ROS: all systems reviewed and wnl      PHYSICAL EXAMINATION:  Vital Signs Last 24 Hrs  T(C): 36.1, Max: 36.7 ( @ 11:49)  T(F): 97, Max: 98 ( @ 11:49)  HR: 80 (68 - 80)  BP: 134/66 (101/55 - 134/66)  BP(mean): --  RR: 15 (15 - 18)  SpO2: 98% (95% - 99%)  CAPILLARY BLOOD GLUCOSE      GENERAL: NAD, well-groomed, well-developed  HEAD:  atraumatic, normocephalic  EYES: sclera anicteric  ENMT: mucous membranes moist  NECK: supple, No JVD  CHEST/LUNG: clear to auscultation bilaterally; no rales, rhonchi, or wheezing b/l  HEART: normal S1, S2  ABDOMEN: BS+, soft, ND, NT   EXTREMITIES:  pulses palpable; no clubbing, cyanosis, or edema b/l LEs  NEURO: awake, alert, interactive; moves all extremities  SKIN: no rashes or lesions      LABS:                        13.2   9.3   )-----------( 219      ( 2017 08:16 )             36.9     2017 07:05    142    |  104    |  12     ----------------------------<  98     2.9     |  29     |  0.67     Ca    7.5        2017 07:05  Phos  2.8       2017 08:16  Mg     1.5       2017 07:05    TPro  6.1    /  Alb  2.5    /  TBili  0.6    /  DBili  x      /  AST  27     /  ALT  36     /  AlkPhos  79     2017 00:41    PT/INR - ( 2017 00:41 )   PT: 11.2 sec;   INR: 1.00 ratio         PTT - ( 2017 00:41 )  PTT:31.0 sec  Urinalysis Basic - ( 2017 03:09 )    Color: Yellow / Appearance: Clear / S.015 / pH: x  Gluc: x / Ketone: Trace  / Bili: Small / Urobili: 1 mg/dL   Blood: x / Protein: 30 mg/dL / Nitrite: Negative   Leuk Esterase: Trace / RBC: 0-2 /HPF / WBC 0-2   Sq Epi: x / Non Sq Epi: Occasional / Bacteria: Few          RADIOLOGY & ADDITIONAL TESTS:      ASSESSMENT AND PLAN: CC/F/U for: dysphagia    INTERVAL HPI/OVERNIGHT EVENTS:  Pt seen and examined at bedside. Complains of abd pain, and wants to go to bathroom, but 'nothing's coming out'. Still w/difficulty tasting food. Urinary retention this AM, s/p bladder scan w/584mL and subsequent Lyons placement    Allergies/Intolerance: No Known Allergies      MEDICATIONS  (STANDING):  dextrose 5% + sodium chloride 0.9%. 1000milliLiter(s) IV Continuous <Continuous>  heparin  Injectable 5000Unit(s) SubCutaneous every 12 hours  potassium chloride   Powder 40milliEquivalent(s) Oral every 4 hours  potassium chloride  10 mEq/100 mL IVPB 10milliEquivalent(s) IV Intermittent once    MEDICATIONS  (PRN):        ROS: all systems reviewed and wnl      PHYSICAL EXAMINATION:  Vital Signs Last 24 Hrs  T(C): 36.1, Max: 36.7 ( @ 11:49)  T(F): 97, Max: 98 ( @ 11:49)  HR: 80 (68 - 80)  BP: 134/66 (101/55 - 134/66)  BP(mean): --  RR: 15 (15 - 18)  SpO2: 98% (95% - 99%)  CAPILLARY BLOOD GLUCOSE      GENERAL: NAD, well-groomed, well-developed  HEAD:  atraumatic, normocephalic  EYES: sclera anicteric  ENMT: mucous membranes moist  NECK: supple, No JVD  CHEST/LUNG: clear to auscultation bilaterally; no rales, rhonchi, or wheezing b/l  HEART: normal S1, S2  ABDOMEN: BS+, soft, ND, NT   EXTREMITIES:  pulses palpable; no clubbing, cyanosis, or edema b/l LEs  NEURO: awake, alert, interactive; moves all extremities  SKIN: no rashes or lesions      LABS:                        13.2   9.3   )-----------( 219      ( 2017 08:16 )             36.9     2017 07:05    142    |  104    |  12     ----------------------------<  98     2.9     |  29     |  0.67     Ca    7.5        2017 07:05  Phos  2.8       2017 08:16  Mg     1.5       2017 07:05    TPro  6.1    /  Alb  2.5    /  TBili  0.6    /  DBili  x      /  AST  27     /  ALT  36     /  AlkPhos  79     2017 00:41    PT/INR - ( 2017 00:41 )   PT: 11.2 sec;   INR: 1.00 ratio         PTT - ( 2017 00:41 )  PTT:31.0 sec  Urinalysis Basic - ( 2017 03:09 )    Color: Yellow / Appearance: Clear / S.015 / pH: x  Gluc: x / Ketone: Trace  / Bili: Small / Urobili: 1 mg/dL   Blood: x / Protein: 30 mg/dL / Nitrite: Negative   Leuk Esterase: Trace / RBC: 0-2 /HPF / WBC 0-2   Sq Epi: x / Non Sq Epi: Occasional / Bacteria: Few      RADIOLOGY & ADDITIONAL TESTS:  CXR : IMPRESSION:  Fibrotic changes both lungs. Of note is an intrathoracicstomach versus   marked elevation left hemidiaphragm. Left lung volume loss.    B/L LE dopplers : IMPRESSION: No evidence of deep venous thrombosis in the bilateral lower extremities.    ASSESSMENT AND PLAN:  89F, HTN, HL, hx oral cancer/resection/radiation s/p radiation tx yesterday, adm w/weakness, anorexia 2/2 difficulty tasting food; labs w/marked hypokalemia, hypomagnesemia likely 2/2 malnutrition; speech/swallow shows dysphagia, started on puree diet; undergoing calorie count, likely needs PEG placement for nutritional supplementation; now w/acute urinary retention    GI/LYTES: protein calorie malnutrition, electrolyte imbalance  -caloric supplementation w/ensure  -calorie count underway  -GI consulted; Dr Gandhi to see pt  -replete K, Mg prn; monitor for need to start standing daily supplementation  -continue puree diet; pt still complains of inability to taste food as main difficulty w/eating    : urinary retention, acute -bladder scan showed 584mL urine, Lyons placed,  consulted    CV: essential HTN - at baseline, pt on multidrug antiHTNve regimen; will restart slowly as pt w/poor po intake and at risk for volume depletion    HEME/ONC: hx oral cancer on active radiation therapy; pt to resume txs at discharge    OTHER:   -DVT prophylaxis w/heparin SQ  -dispo dependent on PT eval CC/F/U for: dysphagia    INTERVAL HPI/OVERNIGHT EVENTS:  Pt seen and examined at bedside. Complains of abd pain, and wants to go to bathroom, but 'nothing's coming out'. Still w/difficulty tasting food. Urinary retention this AM, s/p bladder scan w/584mL and subsequent Lyons placement    Allergies/Intolerance: No Known Allergies      MEDICATIONS  (STANDING):  dextrose 5% + sodium chloride 0.9%. 1000milliLiter(s) IV Continuous <Continuous>  heparin  Injectable 5000Unit(s) SubCutaneous every 12 hours  potassium chloride   Powder 40milliEquivalent(s) Oral every 4 hours  potassium chloride  10 mEq/100 mL IVPB 10milliEquivalent(s) IV Intermittent once    MEDICATIONS  (PRN):        ROS: all systems reviewed and wnl      PHYSICAL EXAMINATION:  Vital Signs Last 24 Hrs  T(C): 36.1, Max: 36.7 ( @ 11:49)  T(F): 97, Max: 98 ( @ 11:49)  HR: 80 (68 - 80)  BP: 134/66 (101/55 - 134/66)  BP(mean): --  RR: 15 (15 - 18)  SpO2: 98% (95% - 99%)  CAPILLARY BLOOD GLUCOSE      GENERAL: NAD, well-groomed, well-developed  HEAD:  atraumatic, normocephalic  EYES: sclera anicteric  ENMT: mucous membranes moist  NECK: supple, No JVD  CHEST/LUNG: clear to auscultation bilaterally; no rales, rhonchi, or wheezing b/l  HEART: normal S1, S2  ABDOMEN: BS+, soft, ND, NT   EXTREMITIES:  pulses palpable; no clubbing, cyanosis, or edema b/l LEs  NEURO: awake, alert, interactive; moves all extremities  SKIN: no rashes or lesions      LABS:                        13.2   9.3   )-----------( 219      ( 2017 08:16 )             36.9     2017 07:05    142    |  104    |  12     ----------------------------<  98     2.9     |  29     |  0.67     Ca    7.5        2017 07:05  Phos  2.8       2017 08:16  Mg     1.5       2017 07:05    TPro  6.1    /  Alb  2.5    /  TBili  0.6    /  DBili  x      /  AST  27     /  ALT  36     /  AlkPhos  79     2017 00:41    PT/INR - ( 2017 00:41 )   PT: 11.2 sec;   INR: 1.00 ratio         PTT - ( 2017 00:41 )  PTT:31.0 sec  Urinalysis Basic - ( 2017 03:09 )    Color: Yellow / Appearance: Clear / S.015 / pH: x  Gluc: x / Ketone: Trace  / Bili: Small / Urobili: 1 mg/dL   Blood: x / Protein: 30 mg/dL / Nitrite: Negative   Leuk Esterase: Trace / RBC: 0-2 /HPF / WBC 0-2   Sq Epi: x / Non Sq Epi: Occasional / Bacteria: Few      RADIOLOGY & ADDITIONAL TESTS:  CXR : IMPRESSION:  Fibrotic changes both lungs. Of note is an intrathoracicstomach versus   marked elevation left hemidiaphragm. Left lung volume loss.    B/L LE dopplers : IMPRESSION: No evidence of deep venous thrombosis in the bilateral lower extremities.    ASSESSMENT AND PLAN:  89F, HTN, HL, hx oral cancer/resection/radiation s/p radiation tx yesterday, adm w/weakness, anorexia 2/2 difficulty tasting food; labs w/marked hypokalemia, hypomagnesemia likely 2/2 malnutrition; speech/swallow shows dysphagia, started on puree diet; undergoing calorie count, likely needs PEG placement for nutritional supplementation; now w/acute urinary retention    GI/LYTES: protein calorie malnutrition, electrolyte imbalance  -caloric supplementation w/ensure  -calorie count underway  -GI consulted; Dr Gandhi to see pt  -replete K, Mg prn; monitor for need to start standing daily supplementation  -continue puree diet; pt still complains of inability to taste food as main difficulty w/eating    : urinary retention, acute -bladder scan showed 584mL urine, Lyons placed,  consulted    CV: essential HTN - at baseline, pt on multidrug antiHTNve regimen; will monitor for need to restart as pt currently w/poor po intake and at risk for volume depletion and hypoTN    HEME/ONC: hx oral cancer on active radiation therapy; pt to resume txs at discharge    OTHER:   -DVT prophylaxis w/heparin SQ  -dispo dependent on PT eval

## 2017-01-21 DIAGNOSIS — I10 ESSENTIAL (PRIMARY) HYPERTENSION: ICD-10-CM

## 2017-01-21 LAB
ANION GAP SERPL CALC-SCNC: 7 MMOL/L — SIGNIFICANT CHANGE UP (ref 5–17)
BUN SERPL-MCNC: 8 MG/DL — SIGNIFICANT CHANGE UP (ref 7–23)
CALCIUM SERPL-MCNC: 7.7 MG/DL — LOW (ref 8.5–10.1)
CHLORIDE SERPL-SCNC: 110 MMOL/L — HIGH (ref 96–108)
CO2 SERPL-SCNC: 26 MMOL/L — SIGNIFICANT CHANGE UP (ref 22–31)
CREAT SERPL-MCNC: 0.6 MG/DL — SIGNIFICANT CHANGE UP (ref 0.5–1.3)
CULTURE RESULTS: NO GROWTH — SIGNIFICANT CHANGE UP
GLUCOSE SERPL-MCNC: 146 MG/DL — HIGH (ref 70–99)
HCT VFR BLD CALC: 39.4 % — SIGNIFICANT CHANGE UP (ref 34.5–45)
HGB BLD-MCNC: 13.9 G/DL — SIGNIFICANT CHANGE UP (ref 11.5–15.5)
MCHC RBC-ENTMCNC: 30.6 PG — SIGNIFICANT CHANGE UP (ref 27–34)
MCHC RBC-ENTMCNC: 35.4 GM/DL — SIGNIFICANT CHANGE UP (ref 32–36)
MCV RBC AUTO: 86.7 FL — SIGNIFICANT CHANGE UP (ref 80–100)
PLATELET # BLD AUTO: 196 K/UL — SIGNIFICANT CHANGE UP (ref 150–400)
POTASSIUM SERPL-MCNC: 4.2 MMOL/L — SIGNIFICANT CHANGE UP (ref 3.5–5.3)
POTASSIUM SERPL-SCNC: 4.2 MMOL/L — SIGNIFICANT CHANGE UP (ref 3.5–5.3)
RBC # BLD: 4.54 M/UL — SIGNIFICANT CHANGE UP (ref 3.8–5.2)
RBC # FLD: 12.6 % — SIGNIFICANT CHANGE UP (ref 11–15)
SODIUM SERPL-SCNC: 143 MMOL/L — SIGNIFICANT CHANGE UP (ref 135–145)
SPECIMEN SOURCE: SIGNIFICANT CHANGE UP
WBC # BLD: 6.6 K/UL — SIGNIFICANT CHANGE UP (ref 3.8–10.5)
WBC # FLD AUTO: 6.6 K/UL — SIGNIFICANT CHANGE UP (ref 3.8–10.5)

## 2017-01-21 PROCEDURE — 99233 SBSQ HOSP IP/OBS HIGH 50: CPT

## 2017-01-21 RX ADMIN — HEPARIN SODIUM 5000 UNIT(S): 5000 INJECTION INTRAVENOUS; SUBCUTANEOUS at 06:15

## 2017-01-21 RX ADMIN — HEPARIN SODIUM 5000 UNIT(S): 5000 INJECTION INTRAVENOUS; SUBCUTANEOUS at 19:10

## 2017-01-21 RX ADMIN — Medication 30 MILLILITER(S): at 20:01

## 2017-01-21 NOTE — PROGRESS NOTE ADULT - SUBJECTIVE AND OBJECTIVE BOX
Pt eating a little better - calorie count in progress    MEDICATIONS  (STANDING):  dextrose 5% + sodium chloride 0.9%. 1000milliLiter(s) IV Continuous <Continuous>  heparin  Injectable 5000Unit(s) SubCutaneous every 12 hours    MEDICATIONS  (PRN):      Allergies    No Known Allergies    Intolerances        Vital Signs Last 24 Hrs  T(C): 36.9, Max: 37.1 (01-20 @ 17:53)  T(F): 98.4, Max: 98.8 (01-20 @ 17:53)  HR: 65 (65 - 95)  BP: 119/56 (105/72 - 126/83)  BP(mean): --  RR: 16 (15 - 16)  SpO2: 96% (96% - 98%)    PHYSICAL EXAM:  General: NAD.  CVS: S1, S2  Chest: air entry bilaterally present  Abd: BS present, soft, non-tender      LABS:                        13.9   6.6   )-----------( 196      ( 21 Jan 2017 07:01 )             39.4     21 Jan 2017 07:01    143    |  110    |  8      ----------------------------<  146    4.2     |  26     |  0.60     Ca    7.7        21 Jan 2017 07:01  Mg     1.5       20 Jan 2017 07:05          follow calorie count - in progress

## 2017-01-21 NOTE — PROGRESS NOTE ADULT - SUBJECTIVE AND OBJECTIVE BOX
89y Female admitted with FAILURE TO THRIVE IN ADULT AND HYPOKALEMIA  GENERAL WEAKNESS, WITH NAUSEA  .    Patient seen and examined bedside resting comfortably.  No complaints offered.       T(F): 98.4, Max: 98.4 (01-21 @ 10:05)  HR: 65 (65 - 95)  BP: 119/56 (105/72 - 120/80)  RR: 16 (15 - 16)  SpO2: 96% (96% - 98%)  Wt(kg): --  CAPILLARY BLOOD GLUCOSE      PHYSICAL EXAM:  General: NAD, WDWN.   Neuro:  Alert & oriented x 3  HEENT: NCAT, EOMI, conjunctiva clear  CV: +S1+S2 regular rate and rhythm  Lung: clear to ausculation bilaterally, respirations nonlabored, good inspiratory effort  Abdomen: soft, NTND. Normactive BS  Extremities: no pedal edema or calf tenderness noted   : No CVA or SP tenderness young in SITU with 700ml/ 24hrs deep yellow urine    LABS:                        13.9   6.6   )-----------( 196      ( 21 Jan 2017 07:01 )             39.4     21 Jan 2017 07:01    143    |  110    |  8      ----------------------------<  146    4.2     |  26     |  0.60     Ca    7.7        21 Jan 2017 07:01  Mg     1.5       20 Jan 2017 07:05        I&O's Detail

## 2017-01-21 NOTE — PROGRESS NOTE ADULT - SUBJECTIVE AND OBJECTIVE BOX
CC/F/U for:    INTERVAL HPI/OVERNIGHT EVENTS:  Pt seen and examined at bedside.     Allergies/Intolerance: No Known Allergies      MEDICATIONS  (STANDING):  dextrose 5% + sodium chloride 0.9%. 1000milliLiter(s) IV Continuous <Continuous>  heparin  Injectable 5000Unit(s) SubCutaneous every 12 hours    MEDICATIONS  (PRN):        ROS: all systems reviewed and wnl      PHYSICAL EXAMINATION:  Vital Signs Last 24 Hrs  T(C): 36.9, Max: 37.1 ( @ 17:53)  T(F): 98.4, Max: 98.8 ( @ 17:53)  HR: 65 (65 - 95)  BP: 119/56 (105/72 - 126/83)  BP(mean): --  RR: 16 (15 - 16)  SpO2: 96% (96% - 98%)  CAPILLARY BLOOD GLUCOSE      GENERAL: NAD, well-groomed, well-developed  HEAD:  atraumatic, normocephalic  EYES: sclera anicteric  ENMT: mucous membranes moist  NECK: supple, No JVD  CHEST/LUNG: clear to auscultation bilaterally; no rales, rhonchi, or wheezing b/l  HEART: normal S1, S2  ABDOMEN: BS+, soft, ND, NT   EXTREMITIES:  pulses palpable; no clubbing, cyanosis, or edema b/l LEs  NEURO: awake, alert, interactive; moves all extremities  SKIN: no rashes or lesions      LABS:                        13.9   6.6   )-----------( 196      ( 2017 07:01 )             39.4     2017 07:01    143    |  110    |  8      ----------------------------<  146    4.2     |  26     |  0.60     Ca    7.7        2017 07:01  Mg     1.5       2017 07:05        Urinalysis Basic - ( 2017 11:58 )    Color: Yellow / Appearance: Clear / S.010 / pH: x  Gluc: x / Ketone: Negative  / Bili: Negative / Urobili: Negative mg/dL   Blood: x / Protein: Negative mg/dL / Nitrite: Negative   Leuk Esterase: Negative / RBC: x / WBC x   Sq Epi: x / Non Sq Epi: x / Bacteria: x          RADIOLOGY & ADDITIONAL TESTS:      ASSESSMENT AND PLAN: CC/F/U for: dysphagia    INTERVAL HPI/OVERNIGHT EVENTS:  Pt seen and examined at bedside. Pt upgraded to mechanical soft foods with thin liquids; reports does not like the food. Pt changed to mechanical soft diet by GI on .    Allergies/Intolerance: No Known Allergies      MEDICATIONS  (STANDING):  dextrose 5% + sodium chloride 0.9%. 1000milliLiter(s) IV Continuous <Continuous>  heparin  Injectable 5000Unit(s) SubCutaneous every 12 hours    MEDICATIONS  (PRN):        ROS: all systems reviewed and wnl      PHYSICAL EXAMINATION:  Vital Signs Last 24 Hrs  T(C): 36.9, Max: 37.1 ( @ 17:53)  T(F): 98.4, Max: 98.8 ( @ 17:53)  HR: 65 (65 - 95)  BP: 119/56 (105/72 - 126/83)  BP(mean): --  RR: 16 (15 - 16)  SpO2: 96% (96% - 98%)  CAPILLARY BLOOD GLUCOSE      GENERAL: frail appearing elderly female; NAD  HEAD:  atraumatic, normocephalic  EYES: sclera anicteric  ENMT: mucous membranes moist  NECK: supple, No JVD  CHEST/LUNG: respirations unlabored, air entry symmetric b/l  HEART: normal S1, S2  ABDOMEN: BS+, soft, ND, NT   EXTREMITIES:  trace LE edema b/l LEs  : young in place  NEURO: awake, alert, interactive; moves all extremities  SKIN: slight erythematous coloration to b/l hands, b/l LEs      LABS:                        13.9   6.6   )-----------( 196      ( 2017 07:01 )             39.4     2017 07:01    143    |  110    |  8      ----------------------------<  146    4.2     |  26     |  0.60     Ca    7.7        2017 07:01  Mg     1.5       2017 07:05        Urinalysis Basic - ( 2017 11:58 )    Color: Yellow / Appearance: Clear / S.010 / pH: x  Gluc: x / Ketone: Negative  / Bili: Negative / Urobili: Negative mg/dL   Blood: x / Protein: Negative mg/dL / Nitrite: Negative   Leuk Esterase: Negative / RBC: x / WBC x   Sq Epi: x / Non Sq Epi: x / Bacteria: x          RADIOLOGY & ADDITIONAL TESTS:  CXR : IMPRESSION:  Fibrotic changes both lungs. Of note is an intrathoracicstomach versus   marked elevation left hemidiaphragm. Left lung volume loss.    B/L LE dopplers : IMPRESSION: No evidence of deep venous thrombosis in the bilateral lower extremities.    ASSESSMENT AND PLAN:  89F, HTN, HL, hx oral cancer/resection/radiation s/p radiation tx yesterday, adm w/weakness, anorexia 2/2 difficulty tasting food, failure to thrive; labs w/marked hypokalemia, hypomagnesemia likely 2/2 malnutrition; speech/swallow shows dysphagia, started on puree diet; transitioned to mechanical soft diet by GI; undergoing calorie count to eval need for PEG placement for nutritional supplementation; also w/acute urinary retention s/p Young    GI/LYTES: protein calorie malnutrition, electrolyte imbalance related to poor oral intake from mouth cancer/treatment; undergoing 3-day calorie count to determine need/indication for PEG placement  -caloric supplementation w/ensure  -calorie count underway, ends   -GI consulted; Dr Gandhi following  -electrolytes ok today; monitor for need for supplementation  -currently on mechanical soft diet; pt still complains of inability to taste food as main difficulty w/eating    : urinary retention, acute w/bladder scan showing 584mL on 17, now s/p Young placement; per , likely 2/2 immobilization    -continue Young    CV: essential HTN - at baseline, pt on multidrug antiHTNve regimen, which is currently on hold; will monitor for need to restart as pt currently w/poor po intake and at risk for volume depletion and hypoTN    HEME/ONC: hx oral cancer on active radiation therapy; pt to resume txs at discharge    OTHER:   -DVT prophylaxis w/heparin SQ  -dispo dependent on PT eval

## 2017-01-22 DIAGNOSIS — I48.91 UNSPECIFIED ATRIAL FIBRILLATION: ICD-10-CM

## 2017-01-22 DIAGNOSIS — I95.9 HYPOTENSION, UNSPECIFIED: ICD-10-CM

## 2017-01-22 DIAGNOSIS — I25.9 CHRONIC ISCHEMIC HEART DISEASE, UNSPECIFIED: ICD-10-CM

## 2017-01-22 DIAGNOSIS — E46 UNSPECIFIED PROTEIN-CALORIE MALNUTRITION: ICD-10-CM

## 2017-01-22 DIAGNOSIS — R00.0 TACHYCARDIA, UNSPECIFIED: ICD-10-CM

## 2017-01-22 DIAGNOSIS — R79.89 OTHER SPECIFIED ABNORMAL FINDINGS OF BLOOD CHEMISTRY: ICD-10-CM

## 2017-01-22 LAB
ANION GAP SERPL CALC-SCNC: 10 MMOL/L — SIGNIFICANT CHANGE UP (ref 5–17)
BUN SERPL-MCNC: 12 MG/DL — SIGNIFICANT CHANGE UP (ref 7–23)
CALCIUM SERPL-MCNC: 7.6 MG/DL — LOW (ref 8.5–10.1)
CHLORIDE SERPL-SCNC: 115 MMOL/L — HIGH (ref 96–108)
CK MB BLD-MCNC: 12.6 % — HIGH (ref 0–3.5)
CK MB CFR SERPL CALC: 5.4 NG/ML — HIGH (ref 0.5–3.6)
CK SERPL-CCNC: 40 U/L — SIGNIFICANT CHANGE UP (ref 26–192)
CK SERPL-CCNC: 43 U/L — SIGNIFICANT CHANGE UP (ref 26–192)
CO2 SERPL-SCNC: 22 MMOL/L — SIGNIFICANT CHANGE UP (ref 22–31)
CREAT SERPL-MCNC: 0.7 MG/DL — SIGNIFICANT CHANGE UP (ref 0.5–1.3)
GLUCOSE SERPL-MCNC: 142 MG/DL — HIGH (ref 70–99)
POTASSIUM SERPL-MCNC: 4.2 MMOL/L — SIGNIFICANT CHANGE UP (ref 3.5–5.3)
POTASSIUM SERPL-SCNC: 4.2 MMOL/L — SIGNIFICANT CHANGE UP (ref 3.5–5.3)
SODIUM SERPL-SCNC: 147 MMOL/L — HIGH (ref 135–145)
TROPONIN I SERPL-MCNC: 1 NG/ML — HIGH (ref 0.01–0.04)
TROPONIN I SERPL-MCNC: 1.12 NG/ML — HIGH (ref 0.01–0.04)

## 2017-01-22 PROCEDURE — 99232 SBSQ HOSP IP/OBS MODERATE 35: CPT

## 2017-01-22 PROCEDURE — 99223 1ST HOSP IP/OBS HIGH 75: CPT

## 2017-01-22 RX ORDER — NITROGLYCERIN 6.5 MG
0.4 CAPSULE, EXTENDED RELEASE ORAL
Qty: 0 | Refills: 0 | Status: DISCONTINUED | OUTPATIENT
Start: 2017-01-22 | End: 2017-01-26

## 2017-01-22 RX ORDER — SODIUM CHLORIDE 9 MG/ML
250 INJECTION INTRAMUSCULAR; INTRAVENOUS; SUBCUTANEOUS ONCE
Qty: 0 | Refills: 0 | Status: COMPLETED | OUTPATIENT
Start: 2017-01-22 | End: 2017-01-22

## 2017-01-22 RX ORDER — DIGOXIN 250 MCG
0.12 TABLET ORAL DAILY
Qty: 0 | Refills: 0 | Status: DISCONTINUED | OUTPATIENT
Start: 2017-01-23 | End: 2017-01-26

## 2017-01-22 RX ORDER — ASPIRIN/CALCIUM CARB/MAGNESIUM 324 MG
325 TABLET ORAL DAILY
Qty: 0 | Refills: 0 | Status: DISCONTINUED | OUTPATIENT
Start: 2017-01-22 | End: 2017-01-26

## 2017-01-22 RX ORDER — DIGOXIN 250 MCG
0.25 TABLET ORAL EVERY 8 HOURS
Qty: 0 | Refills: 0 | Status: COMPLETED | OUTPATIENT
Start: 2017-01-22 | End: 2017-01-22

## 2017-01-22 RX ORDER — DILTIAZEM HCL 120 MG
240 CAPSULE, EXT RELEASE 24 HR ORAL DAILY
Qty: 0 | Refills: 0 | Status: DISCONTINUED | OUTPATIENT
Start: 2017-01-22 | End: 2017-01-22

## 2017-01-22 RX ADMIN — Medication 0.25 MILLIGRAM(S): at 15:12

## 2017-01-22 RX ADMIN — HEPARIN SODIUM 5000 UNIT(S): 5000 INJECTION INTRAVENOUS; SUBCUTANEOUS at 05:25

## 2017-01-22 RX ADMIN — SODIUM CHLORIDE 100 MILLILITER(S): 9 INJECTION, SOLUTION INTRAVENOUS at 05:24

## 2017-01-22 RX ADMIN — Medication 240 MILLIGRAM(S): at 07:11

## 2017-01-22 RX ADMIN — HEPARIN SODIUM 5000 UNIT(S): 5000 INJECTION INTRAVENOUS; SUBCUTANEOUS at 18:22

## 2017-01-22 RX ADMIN — Medication 325 MILLIGRAM(S): at 18:40

## 2017-01-22 RX ADMIN — SODIUM CHLORIDE 100 MILLILITER(S): 9 INJECTION, SOLUTION INTRAVENOUS at 12:10

## 2017-01-22 RX ADMIN — SODIUM CHLORIDE 1000 MILLILITER(S): 9 INJECTION INTRAMUSCULAR; INTRAVENOUS; SUBCUTANEOUS at 05:58

## 2017-01-22 RX ADMIN — Medication 0.25 MILLIGRAM(S): at 23:06

## 2017-01-22 NOTE — PROGRESS NOTE ADULT - PROBLEM SELECTOR PLAN 1
STAT    12    LEAD    EKG   , Cardiac    monitor  , CPK , CKMB , Troponin   I   q  8hrs    1 st    set    stat   Transfer   to   2C/2D    Telemetry

## 2017-01-22 NOTE — PROGRESS NOTE ADULT - SUBJECTIVE AND OBJECTIVE BOX
Patient is a 89y old  Female who presents with a chief complaint of weakness and failure to thrive (2017 05:25)  Nurse     called    pt   HR    124  / min  , pt   seen   at   bed   side , denies   chest   pain , denies sob , pt   stats   she  was  on Heart   meds   at   home     INTERVAL HPI/OVERNIGHT EVENTS:    MEDICATIONS  (STANDING):  dextrose 5% + sodium chloride 0.9%. 1000milliLiter(s) IV Continuous <Continuous>  heparin  Injectable 5000Unit(s) SubCutaneous every 12 hours  diltiazem   CD 240milliGRAM(s) Oral daily    MEDICATIONS  (PRN):      Allergies    No Known Allergies    Intolerances        RVital Signs Last 24 Hrs  T(C): 36.4, Max: 37.2 ( @ 18:25)  T(F): 97.6, Max: 99 ( @ 18:25)  HR: 126 (61 - 126)  BP: 92/70 (92/70 - 119/56)  BP(mean): --  RR: 16 (16 - 16)  SpO2: 96% (93% - 96%)    PHYSICAL EXAM:  GENERAL:Pt   is   cachectic   &  frail        CHEST/LUNG: B/L    air   entery   HEART: S1, S2   +   Irregular   rhythm   ABDOMEN: Soft, Nontender, Nondistended; Bowel sounds present  EXTREMITIES:  Trace  edema  pr      LABS:                        13.9   6.6   )-----------( 196      ( 2017 07:01 )             39.4     2017 07:01    143    |  110    |  8      ----------------------------<  146    4.2     |  26     |  0.60     Ca    7.7        2017 07:01  Mg     1.5       2017 07:05        Urinalysis Basic - ( 2017 11:58 )    Color: Yellow / Appearance: Clear / S.010 / pH: x  Gluc: x / Ketone: Negative  / Bili: Negative / Urobili: Negative mg/dL   Blood: x / Protein: Negative mg/dL / Nitrite: Negative   Leuk Esterase: Negative / RBC: x / WBC x   Sq Epi: x / Non Sq Epi: x / Bacteria: x      CAPILLARY BLOOD GLUCOSE    Lipid panel:           TSH     RADIOLOGY & ADDITIONAL TESTS:    Imaging Personally Reviewed:  [ ] YES  [ ] NO    Consultant(s) Notes Reviewed:  [ ] YES  [ ] NO    Care Discussed with Consultants/Other Providers [ ] YES  [ ] NO

## 2017-01-22 NOTE — PROGRESS NOTE ADULT - SUBJECTIVE AND OBJECTIVE BOX
CC/F/U for:    INTERVAL HPI/OVERNIGHT EVENTS:  Pt seen and examined at bedside.     Allergies/Intolerance: No Known Allergies      MEDICATIONS  (STANDING):  dextrose 5% + sodium chloride 0.9%. 1000milliLiter(s) IV Continuous <Continuous>  heparin  Injectable 5000Unit(s) SubCutaneous every 12 hours  diltiazem    Tablet 90milliGRAM(s) Oral every 6 hours  digoxin     Tablet 0.25milliGRAM(s) Oral every 8 hours    MEDICATIONS  (PRN):        ROS: all systems reviewed and wnl      PHYSICAL EXAMINATION:  Vital Signs Last 24 Hrs  T(C): 36.1, Max: 37.2 (01-21 @ 18:25)  T(F): 97, Max: 99 (01-21 @ 18:25)  HR: 123 (61 - 126)  BP: 95/72 (92/70 - 104/75)  BP(mean): --  RR: 17 (16 - 17)  SpO2: 96% (93% - 96%)  CAPILLARY BLOOD GLUCOSE      GENERAL: NAD, well-groomed, well-developed  HEAD:  atraumatic, normocephalic  EYES: sclera anicteric  ENMT: mucous membranes moist  NECK: supple, No JVD  CHEST/LUNG: clear to auscultation bilaterally; no rales, rhonchi, or wheezing b/l  HEART: normal S1, S2  ABDOMEN: BS+, soft, ND, NT   EXTREMITIES:  pulses palpable; no clubbing, cyanosis, or edema b/l LEs  NEURO: awake, alert, interactive; moves all extremities  SKIN: no rashes or lesions      LABS:                        13.9   6.6   )-----------( 196      ( 21 Jan 2017 07:01 )             39.4     22 Jan 2017 06:33    147    |  115    |  12     ----------------------------<  142    4.2     |  22     |  0.70     Ca    7.6        22 Jan 2017 06:33              RADIOLOGY & ADDITIONAL TESTS:      ASSESSMENT AND PLAN: CC/F/U for: dysphagia    INTERVAL HPI/OVERNIGHT EVENTS:  Pt seen and examined at bedside. Overnight s/p RRT for tachycardia to 120s, found to be in Afib/RVR, unclear if new, s/p cardizem, also trop 1.12->1.00; Currently pt denies chest pain, SOB.        Allergies/Intolerance: No Known Allergies      MEDICATIONS  (STANDING):  dextrose 5% + sodium chloride 0.9%. 1000milliLiter(s) IV Continuous <Continuous>  heparin  Injectable 5000Unit(s) SubCutaneous every 12 hours  diltiazem    Tablet 90milliGRAM(s) Oral every 6 hours  digoxin     Tablet 0.25milliGRAM(s) Oral every 8 hours    MEDICATIONS  (PRN):        ROS: all systems reviewed and wnl      PHYSICAL EXAMINATION:  Vital Signs Last 24 Hrs  T(C): 36.1, Max: 37.2 (01-21 @ 18:25)  T(F): 97, Max: 99 (01-21 @ 18:25)  HR: 123 (61 - 126)  BP: 95/72 (92/70 - 104/75)  BP(mean): --  RR: 17 (16 - 17)  SpO2: 96% (93% - 96%)  CAPILLARY BLOOD GLUCOSE      GENERAL: frail appearing elderly female; NAD  HEAD:  atraumatic, normocephalic  EYES: sclera anicteric  ENMT: mucous membranes moist  NECK: supple, No JVD  CHEST/LUNG: respirations unlabored, air entry symmetric b/l  HEART: normal S1, S2  ABDOMEN: BS+, soft, ND, NT   EXTREMITIES:  trace LE edema b/l LEs; back w/marked lordosis  : young in place  NEURO: awake, alert, interactive; moves all extremities  SKIN: slight erythematous coloration to b/l hands, b/l LEs      LABS:                        13.9   6.6   )-----------( 196      ( 21 Jan 2017 07:01 )             39.4     22 Jan 2017 06:33    147    |  115    |  12     ----------------------------<  142    4.2     |  22     |  0.70     Ca    7.6        22 Jan 2017 06:33      Troponin I, Serum: 1.000 ng/mL (01-22 @ 15:27)  Troponin I, Serum: 1.120 ng/mL (01-22 @ 06:32)        RADIOLOGY & ADDITIONAL TESTS:  CXR 1/19: IMPRESSION:  Fibrotic changes both lungs. Of note is an intrathoracicstomach versus   marked elevation left hemidiaphragm. Left lung volume loss.    B/L LE dopplers 1/19: IMPRESSION: No evidence of deep venous thrombosis in the bilateral lower extremities.    ASSESSMENT AND PLAN:  89F, HTN, HL, hx oral cancer/resection/radiation s/p radiation tx yesterday, adm w/weakness, anorexia 2/2 difficulty tasting food, failure to thrive; labs w/marked hypokalemia, hypomagnesemia likely 2/2 malnutrition; speech/swallow shows dysphagia, started on puree diet; transitioned to mechanical soft diet by GI; undergoing calorie count to eval need for PEG placement for nutritional supplementation; also w/acute urinary retention s/p Young and now Afib/RVR, unclear if had in past    CV:   *Afib - unclear if pt w/prior hx; as per Cardiology, Dr Live: rate w/diltiazem, digoxin; also pt is poor anticoagulation candidate  *cardiac ischemia w/abnormal troponins - TTE pending, trending troponins  *hypoTN - supportive IVFs; holding antiHTNves  *essential HTN - at baseline, pt on multidrug antiHTNve regimen, which is currently on hold given poor po intake, and risk for volume depletion and hypoTN    GI/LYTES: protein calorie malnutrition, electrolyte imbalance related to poor oral intake from mouth cancer/treatment and pt complaint of inability to 'taste food'; undergoing 3-day calorie count to determine need/indication for PEG placement  -caloric supplementation w/ensure  -calorie count underway, ends today  -GI consulted; Dr Gandhi following  -electrolytes ok today; monitor for need for supplementation  -currently on mechanical soft diet    : acute urinary retention, w/bladder scan showing 584mL on 1/20/17, now s/p Young placement; per , likely 2/2 immobilization  -continue Young; trial of void later    HEME/ONC: hx oral cancer on active radiation therapy; pt to resume txs at discharge    OTHER:   -DVT prophylaxis w/heparin SQ  -dispo dependent on PT eval

## 2017-01-22 NOTE — PROGRESS NOTE ADULT - SUBJECTIVE AND OBJECTIVE BOX
Pt transferred to 2D for tachycardia - feels ok now      MEDICATIONS  (STANDING):  dextrose 5% + sodium chloride 0.9%. 1000milliLiter(s) IV Continuous <Continuous>  heparin  Injectable 5000Unit(s) SubCutaneous every 12 hours  diltiazem   CD 240milliGRAM(s) Oral daily    MEDICATIONS  (PRN):      Allergies    No Known Allergies    Intolerances        Vital Signs Last 24 Hrs  T(C): 36.4, Max: 37.2 (01-21 @ 18:25)  T(F): 97.6, Max: 99 (01-21 @ 18:25)  HR: 125 (61 - 126)  BP: 95/73 (92/70 - 104/75)  BP(mean): --  RR: 16 (16 - 16)  SpO2: 96% (93% - 96%)    PHYSICAL EXAM:  General: NAD.  CVS: S1, S2  Chest: air entry bilaterally present  Abd: BS present, soft, non-tender      LABS:                        13.9   6.6   )-----------( 196      ( 21 Jan 2017 07:01 )             39.4     22 Jan 2017 06:33    147    |  115    |  12     ----------------------------<  142    4.2     |  22     |  0.70     Ca    7.6        22 Jan 2017 06:33        Finish calorie count  encourage feeding  will consider PEG if calorie intake is poor

## 2017-01-22 NOTE — CONSULT NOTE ADULT - SUBJECTIVE AND OBJECTIVE BOX
Chief Complaint:  Patient is a 89y old  Female who presents with a chief complaint of weakness and failure to thrive (19 Jan 2017 05:25)      HPI:  89 year old female with PMH of oral cancer s/p radiation therapy yesterday (has received 5 treatments to date), HTN, HLD and PSH of oral surgery 2/2 to cancer, partial thyroidectomy, and right leg surgery presents to ED complaining of weakness and decreased appetitie. Patient states that due to the radiation she no longer can taste any of her food so she eats very little. She states that she wants nutrition through her veins, she does not want to eat and does not was a NGT for feeding. Admits to 1 episode of diarrhea 2 days ago. Denies fever, chills, sob, chest pain, palpitations, nausea, vomiting, and dysuria. Admits to using a walker for ambulation at home. Patient states she has a home health aid 7 days a week for 6 hours a day, but she lives alone. Patient does not have a list of her medications with her, and is unsure if she has had Afib in the past. (19 Jan 2017 05:25)    Allergies:        Allergies:  	No Known Allergies:     Home Medications:   * Outpatient Medication Status not yet specified    Past Medical History:  HTN (hypertension)    Hyperlipidemia    Mouth cancer.    Past Surgical History:  H/O oral surgery    H/O partial thyroidectomy.    Family History:  No pertinent family history in first degree relatives.    Social History:  · Lives With	alone 	  Relevant Family History:     Non-contributory    Review of Systems:  General:  No wt loss, fevers, chills, night sweats  Eyes:  Good vision, no reported pain  ENT:  No sore throat, pain, runny nose, dysphagia  CV:  No pain, palpitations hypo/hypertension  Resp:  No dyspnea, cough, tachypnea, wheezing  GI:  No pain, nausea, vomiting, diarrhea, constipation  :  No pain, bleeding, incontinence, nocturia  Muscle:  No pain, weakness  Breast:  No pain, abscess, mass, discharge  Neuro:  No weakness, tingling, memory problems  Psych:  No fatigue, insomnia, mood problems, depression  Endocrine:  No polyuria, polydipsia cold/heat intolerance  Heme:  No petechiae, ecchymosis, easy bruisability  Skin:  No rash, tattoos, scars, edema      Physical Exam:    Vital Signs:  Vital Signs Last 24 Hrs  T(C): 36.1, Max: 37.2 (01-21 @ 18:25)  T(F): 97, Max: 99 (01-21 @ 18:25)  HR: 123 (61 - 126)  BP: 95/72 (92/70 - 104/75)  RR: 17 (16 - 17)  SpO2: 96% (93% - 96%)    Tele:  General:  Appears stated age, well-groomed, well-nourished, no distress  HEENT:  NC/AT, patent nares w/ pink mucosa, OP clear w/o lesions, EOMI, conjunctivae clear, no thyromegaly, no JVD  Chest:  Full & symmetric excursion, no increased effort, breath sounds clear  Cardiovascular:  Regular rhythm, S1, S2, no murmur/rub/S3/S4, no carotid bruit, radial/pedal pulses 2+, no edema  Abdomen:  Soft, non-tender, non-distended, normoactive bowel sounds  Extremities:  No c/c/edema.  Skin:  No rash/erythema/ecchymoses. Skin is warm/dry  Musculoskeletal:  Full ROM in all joints w/o swelling/tenderness/effusion  Neuro/Psych:  Alert, oriented.       Laboratory:                            13.9   6.6   )-----------( 196      ( 21 Jan 2017 07:01 )             39.4     22 Jan 2017 06:33    147    |  115    |  12     ----------------------------<  142    4.2     |  22     |  0.70     Ca    7.6        22 Jan 2017 06:33        CARDIAC MARKERS ( 22 Jan 2017 06:32 )  1.120 ng/mL / x     / 43 U/L / x     / 5.4 ng/mL    Imaging:  cxr:  IMPRESSION:  Fibrotic changes both lungs. Of note is an intrathoracicstomach versus   marked elevation left hemidiaphragm. Left lung volume loss.    US DPLX LWR EXT VEINS COMPL BI                     IMPRESSION:  No evidence of deep venous thrombosis in the bilateral lower extremities.      Assessment: 89 year old female with PMH of oral cancer s/p radiation therapy yesterday (has received 5 treatments to date), HTN, HLD and PSH of oral surgery 2/2 to cancer, partial thyroidectomy, and right leg surgery presents to ED complaining of weakness and decreased appetitie. Patient states that due to the radiation she no longer can taste any of her food so she eats very little. She states that she wants nutrition through her veins, she does not want to eat and does not was a NGT for feeding. Admits to 1 episode of diarrhea 2 days ago. Denies fever, chills, sob, chest pain, palpitations, nausea, vomiting, and dysuria. Admits to using a walker for ambulation at home. Patient states she has a home health aid 7 days a week for 6 hours a day, but she lives alone. Patient does not have a list of her medications with her, and is unsure if she has had Afib in the past.      Plan:     Tele monitoring. Bladder catheter, nutritional support, check echo.  Con't with:  MEDICATIONS  (STANDING):  dextrose 5% + sodium chloride 0.9%. 1000milliLiter(s) IV Continuous <Continuous>  heparin  Injectable 5000Unit(s) SubCutaneous every 12 hours  diltiazem   CD 240milliGRAM(s) Oral daily        Roberto Live MD, FACC, PHYLLIS, RANDI, FACP  Director, Heart Failure Services  [facility]  , Department of Cardiology  [facility] [employer] Chief Complaint:  Patient is a 89y old  Female who presents with a chief complaint of weakness and failure to thrive (19 Jan 2017 05:25)      HPI:  89 year old female with PMH of oral cancer s/p radiation therapy yesterday (has received 5 treatments to date), HTN, HLD and PSH of oral surgery 2/2 to cancer, partial thyroidectomy, and right leg surgery presents to ED complaining of weakness and decreased appetitie. Patient states that due to the radiation she no longer can taste any of her food so she eats very little. She states that she wants nutrition through her veins, she does not want to eat and does not was a NGT for feeding. Admits to 1 episode of diarrhea 2 days ago. Denies fever, chills, sob, chest pain, palpitations, nausea, vomiting, and dysuria. Admits to using a walker for ambulation at home. Patient states she has a home health aid 7 days a week for 6 hours a day, but she lives alone. Patient does not have a list of her medications with her, and is unsure if she has had Afib in the past. (19 Jan 2017 05:25) No cp or palpitations. Occasional SOB.    Allergies:        Allergies:  	No Known Allergies:     Home Medications:   * Outpatient Medication Status not yet specified    Past Medical History:  HTN (hypertension)    Hyperlipidemia    Mouth cancer.    Past Surgical History:  H/O oral surgery    H/O partial thyroidectomy.    Family History:  No pertinent family history in first degree relatives.    Social History:  · Lives With	alone 	  Relevant Family History:     Non-contributory    Review of Systems:  General:  No wt loss, fevers, chills, night sweats  Eyes:  No reported pain  ENT:  No sore throat, pain, runny nose, dysphagia  CV:  No pain, palpitations hypo/hypertension  Resp:  No cough, tachypnea, wheezing. Positive dyspnea  GI:  No pain, nausea, vomiting, diarrhea, constipation  :  No pain, bleeding, incontinence, nocturia  Muscle:  No pain, Positive weakness/falls.  Breast:  No pain, abscess, mass, discharge  Neuro:  No weakness, tingling, memory problems  Psych:  No fatigue, insomnia, mood problems, depression  Endocrine:  No polyuria, polydipsia cold/heat intolerance  Heme:  No petechiae, ecchymosis, easy bruisability  Skin:  No rash, edema      Physical Exam:    Vital Signs:  Vital Signs Last 24 Hrs  T(C): 36.1, Max: 37.2 (01-21 @ 18:25)  T(F): 97, Max: 99 (01-21 @ 18:25)  HR: 123 (61 - 126)  BP: 95/72 (92/70 - 104/75)  RR: 17 (16 - 17)  SpO2: 96% (93% - 96%)    Tele: Rapid AF  General:  Appears stated age, well-groomed, well-nourished, no distress  HEENT:  NC/AT, patent nares w/ pink mucosa, OP clear w/o lesions, EOMI, conjunctivae clear, no thyromegaly, no JVD  Chest:  Full & symmetric excursion, no increased effort, breath sounds clear  Cardiovascular:  Irregular rhythm, S1, S2, no murmur/rub/S3/S4, no carotid bruit, radial/pedal pulses 2+, no edema  Abdomen:  Soft, non-tender, non-distended, normoactive bowel sounds  Extremities:  No c/c/edema.  Skin:  No rash/erythema/ecchymoses. Skin is warm/dry  Musculoskeletal:  Full ROM in all joints w/o swelling/tenderness/effusion  Neuro/Psych:  Alert, oriented.       Laboratory:                            13.9   6.6   )-----------( 196      ( 21 Jan 2017 07:01 )             39.4     22 Jan 2017 06:33    147    |  115    |  12     ----------------------------<  142    4.2     |  22     |  0.70     Ca    7.6        22 Jan 2017 06:33        CARDIAC MARKERS ( 22 Jan 2017 06:32 )  1.120 ng/mL / x     / 43 U/L / x     / 5.4 ng/mL    Imaging:  cxr:  IMPRESSION:  Fibrotic changes both lungs. Of note is an intrathoracic stomach versus   marked elevation left hemidiaphragm. Left lung volume loss.    US DPLX LWR EXT VEINS COMPL BI                     IMPRESSION:  No evidence of deep venous thrombosis in the bilateral lower extremities.    ECG. AF, RBBB, nonspecific ST T abnls.    Assessment: 89 year old female with PMH of oral cancer s/p radiation therapy yesterday (has received 5 treatments to date), HTN, HLD and PSH of oral surgery 2/2 to cancer, partial thyroidectomy, and right leg surgery presents to ED complaining of weakness and decreased appetitie. Patient states that due to the radiation she no longer can taste any of her food so she eats very little. She states that she wants nutrition through her veins, she does not want to eat and does not was a NGT for feeding. Admits to 1 episode of diarrhea 2 days ago. Denies fever, chills, sob, chest pain, palpitations, nausea, vomiting, and dysuria. Admits to using a walker for ambulation at home. Patient states she has a home health aid 7 days a week for 6 hours a day, but she lives alone. Patient does not have a list of her medications with her, and is unsure if she has had Afib in the past.      Plan:       Tele monitoring. Bladder catheter, nutritional support, check echo.  change diltiazem 90 mg Q 6 hours.    Con't with:  MEDICATIONS  (STANDING):  dextrose 5% + sodium chloride 0.9%. 1000milliLiter(s) IV Continuous <Continuous>  heparin  Injectable 5000Unit(s) SubCutaneous every 12 hours      Roberto Live MD, FACC, RANDI FERGUSON, FACP  Director, Heart Failure Services  [facility]  , Department of Cardiology  [facility] [employer] Chief Complaint:  Patient is a 89y old  Female who presents with a chief complaint of weakness and failure to thrive (19 Jan 2017 05:25)      HPI:  89 year old female with PMH of oral cancer s/p radiation therapy yesterday (has received 5 treatments to date), HTN, HLD and PSH of oral surgery 2/2 to cancer, partial thyroidectomy, and right leg surgery presents to ED complaining of weakness and decreased appetitie. Patient states that due to the radiation she no longer can taste any of her food so she eats very little. She states that she wants nutrition through her veins, she does not want to eat and does not was a NGT for feeding. Admits to 1 episode of diarrhea 2 days ago. Denies fever, chills, sob, chest pain, palpitations, nausea, vomiting, and dysuria. Admits to using a walker for ambulation at home. Patient states she has a home health aid 7 days a week for 6 hours a day, but she lives alone. Patient does not have a list of her medications with her, and is unsure if she has had Afib in the past. (19 Jan 2017 05:25) No cp or palpitations. Occasional SOB.    Allergies:        Allergies:  	No Known Allergies:     Home Medications:   * Outpatient Medication Status not yet specified    Past Medical History:  HTN (hypertension)    Hyperlipidemia    Mouth cancer.    Past Surgical History:  H/O oral surgery    H/O partial thyroidectomy.    Family History:  No pertinent family history in first degree relatives.    Social History:  · Lives With	alone 	  Relevant Family History:     Non-contributory    Review of Systems:  General:  No wt loss, fevers, chills, night sweats  Eyes:  No reported pain  ENT:  No sore throat, pain, runny nose, dysphagia  CV:  No pain, palpitations hypo/hypertension  Resp:  No cough, tachypnea, wheezing. Positive dyspnea  GI:  No pain, nausea, vomiting, diarrhea, constipation  :  No pain, bleeding, incontinence, nocturia  Muscle:  No pain, Positive weakness/falls.  Breast:  No pain, abscess, mass, discharge  Neuro:  No weakness, tingling, memory problems  Psych:  No fatigue, insomnia, mood problems, depression  Endocrine:  No polyuria, polydipsia cold/heat intolerance  Heme:  No petechiae, ecchymosis, easy bruisability  Skin:  No rash, edema      Physical Exam:    Vital Signs:  Vital Signs Last 24 Hrs  T(C): 36.1, Max: 37.2 (01-21 @ 18:25)  T(F): 97, Max: 99 (01-21 @ 18:25)  HR: 123 (61 - 126)  BP: 95/72 (92/70 - 104/75)  RR: 17 (16 - 17)  SpO2: 96% (93% - 96%)    Tele: Rapid AF  General:  Appears stated age, well-groomed, well-nourished, no distress  HEENT:  NC/AT, patent nares w/ pink mucosa, OP clear w/o lesions, EOMI, conjunctivae clear, no thyromegaly, no JVD  Chest:  Full & symmetric excursion, no increased effort, breath sounds clear  Cardiovascular:  Irregular rhythm, S1, S2, no murmur/rub/S3/S4, no carotid bruit, radial/pedal pulses 2+, no edema  Abdomen:  Soft, non-tender, non-distended, normoactive bowel sounds  Extremities:  No c/c/edema.  Skin:  No rash/erythema/ecchymoses. Skin is warm/dry  Musculoskeletal:  Full ROM in all joints w/o swelling/tenderness/effusion  Neuro/Psych:  Alert, oriented.       Laboratory:                            13.9   6.6   )-----------( 196      ( 21 Jan 2017 07:01 )             39.4     22 Jan 2017 06:33    147    |  115    |  12     ----------------------------<  142    4.2     |  22     |  0.70     Ca    7.6        22 Jan 2017 06:33        CARDIAC MARKERS ( 22 Jan 2017 06:32 )  1.120 ng/mL / x     / 43 U/L / x     / 5.4 ng/mL    Imaging:  cxr:  IMPRESSION:  Fibrotic changes both lungs. Of note is an intrathoracic stomach versus   marked elevation left hemidiaphragm. Left lung volume loss.    US DPLX LWR EXT VEINS COMPL BI                     IMPRESSION:  No evidence of deep venous thrombosis in the bilateral lower extremities.    ECG. AF, RBBB, nonspecific ST T abnls.    Assessment: 89 year old female with PMH of oral cancer s/p radiation therapy yesterday (has received 5 treatments to date), HTN, HLD and PSH of oral surgery 2/2 to cancer, partial thyroidectomy, and right leg surgery presents to ED complaining of weakness and decreased appetitie. Patient states that due to the radiation she no longer can taste any of her food so she eats very little. She states that she wants nutrition through her veins, she does not want to eat and does not was a NGT for feeding. Admits to 1 episode of diarrhea 2 days ago. Denies fever, chills, sob, chest pain, palpitations, nausea, vomiting, and dysuria. Admits to using a walker for ambulation at home. Patient states she has a home health aid 7 days a week for 6 hours a day, but she lives alone. Patient does not have a list of her medications with her, and is unsure if she has had Afib in the past.      Plan:       Tele monitoring. Bladder catheter, nutritional support, check echo.  change diltiazem 90 mg Q 6 hours. Add digoxin as well.  Not a good A/C candidate.    Con't with:  MEDICATIONS  (STANDING):  dextrose 5% + sodium chloride 0.9%. 1000milliLiter(s) IV Continuous <Continuous>  heparin  Injectable 5000Unit(s) SubCutaneous every 12 hours      Roberto Live MD, FACC, PHYLLIS, RANDI, FACP  Director, Heart Failure Services  [facility]  , Department of Cardiology  [facility] [employer]

## 2017-01-22 NOTE — PROGRESS NOTE ADULT - SUBJECTIVE AND OBJECTIVE BOX
Patient seen and examined bedside resting comfortably.  No complaints offered.     T(F): 98.6, Max: 99 (01-21 @ 18:25)  HR: 101 (61 - 126)  BP: 100/67 (92/70 - 104/75)  RR: 18 (16 - 18)  SpO2: 91% (91% - 96%)    General: Alert, oriented, NAD  CV: +S1S2   Lung: respirations nonlabored  Abdomen: soft, NTND  Extremities: no pedal edema or calf tenderness noted   : young catheter in situ draining clear, yellow urine: 610cc/24hrs    LABS:                        13.9   6.6   )-----------( 196      ( 21 Jan 2017 07:01 )             39.4     22 Jan 2017 06:33    147    |  115    |  12     ----------------------------<  142    4.2     |  22     |  0.70     Ca    7.6        22 Jan 2017 06:33      Impression: 89y Female admitted with weakness, hypokalemia, urinary retention likely 2/2 immobility, now with tachycardia  PMH Hyperlipidemia  HTN (hypertension)  Mouth cancer      Plan:  -continue young catheter, urine output monitoring and trend renal function  -TOV when pt ambulatory   -continue all medical management/GI/cardio f/u   -will discuss with Dr Arnold for further recommendations

## 2017-01-23 LAB
ALBUMIN SERPL ELPH-MCNC: 1.8 G/DL — LOW (ref 3.3–5)
ALP SERPL-CCNC: 64 U/L — SIGNIFICANT CHANGE UP (ref 40–120)
ALT FLD-CCNC: 30 U/L — SIGNIFICANT CHANGE UP (ref 12–78)
ANION GAP SERPL CALC-SCNC: 10 MMOL/L — SIGNIFICANT CHANGE UP (ref 5–17)
AST SERPL-CCNC: 21 U/L — SIGNIFICANT CHANGE UP (ref 15–37)
BASE EXCESS BLDA CALC-SCNC: -1.3 MMOL/L — SIGNIFICANT CHANGE UP (ref -2–2)
BASOPHILS # BLD AUTO: 0 K/UL — SIGNIFICANT CHANGE UP (ref 0–0.2)
BASOPHILS NFR BLD AUTO: 0.5 % — SIGNIFICANT CHANGE UP (ref 0–2)
BILIRUB SERPL-MCNC: 0.4 MG/DL — SIGNIFICANT CHANGE UP (ref 0.2–1.2)
BLOOD GAS COMMENTS: SIGNIFICANT CHANGE UP
BLOOD GAS COMMENTS: SIGNIFICANT CHANGE UP
BLOOD GAS SOURCE: SIGNIFICANT CHANGE UP
BUN SERPL-MCNC: 17 MG/DL — SIGNIFICANT CHANGE UP (ref 7–23)
CALCIUM SERPL-MCNC: 7.4 MG/DL — LOW (ref 8.5–10.1)
CHLORIDE SERPL-SCNC: 115 MMOL/L — HIGH (ref 96–108)
CK MB BLD-MCNC: 11.4 % — HIGH (ref 0–3.5)
CK MB CFR SERPL CALC: 3.2 NG/ML — SIGNIFICANT CHANGE UP (ref 0.5–3.6)
CK SERPL-CCNC: 28 U/L — SIGNIFICANT CHANGE UP (ref 26–192)
CK SERPL-CCNC: 37 U/L — SIGNIFICANT CHANGE UP (ref 26–192)
CO2 SERPL-SCNC: 22 MMOL/L — SIGNIFICANT CHANGE UP (ref 22–31)
CREAT SERPL-MCNC: 0.66 MG/DL — SIGNIFICANT CHANGE UP (ref 0.5–1.3)
EOSINOPHIL # BLD AUTO: 0 K/UL — SIGNIFICANT CHANGE UP (ref 0–0.5)
EOSINOPHIL NFR BLD AUTO: 0.2 % — SIGNIFICANT CHANGE UP (ref 0–6)
GLUCOSE SERPL-MCNC: 163 MG/DL — HIGH (ref 70–99)
HCO3 BLDA-SCNC: 22 MMOL/L — SIGNIFICANT CHANGE UP (ref 21–29)
HCT VFR BLD CALC: 38.4 % — SIGNIFICANT CHANGE UP (ref 34.5–45)
HGB BLD-MCNC: 13.5 G/DL — SIGNIFICANT CHANGE UP (ref 11.5–15.5)
HOROWITZ INDEX BLDA+IHG-RTO: 50 — SIGNIFICANT CHANGE UP
LYMPHOCYTES # BLD AUTO: 1.3 K/UL — SIGNIFICANT CHANGE UP (ref 1–3.3)
LYMPHOCYTES # BLD AUTO: 13.6 % — SIGNIFICANT CHANGE UP (ref 13–44)
MCHC RBC-ENTMCNC: 30.5 PG — SIGNIFICANT CHANGE UP (ref 27–34)
MCHC RBC-ENTMCNC: 35.2 GM/DL — SIGNIFICANT CHANGE UP (ref 32–36)
MCV RBC AUTO: 86.5 FL — SIGNIFICANT CHANGE UP (ref 80–100)
MONOCYTES # BLD AUTO: 0.4 K/UL — SIGNIFICANT CHANGE UP (ref 0–0.9)
MONOCYTES NFR BLD AUTO: 4.7 % — SIGNIFICANT CHANGE UP (ref 2–14)
NEUTROPHILS # BLD AUTO: 7.5 K/UL — HIGH (ref 1.8–7.4)
NEUTROPHILS NFR BLD AUTO: 81 % — HIGH (ref 43–77)
NT-PROBNP SERPL-SCNC: HIGH PG/ML (ref 0–450)
PCO2 BLDA: 31 MMHG — LOW (ref 32–46)
PH BLD: 7.45 — SIGNIFICANT CHANGE UP (ref 7.35–7.45)
PLATELET # BLD AUTO: 166 K/UL — SIGNIFICANT CHANGE UP (ref 150–400)
PO2 BLDA: 98 MMHG — SIGNIFICANT CHANGE UP (ref 74–108)
POTASSIUM SERPL-MCNC: 4.3 MMOL/L — SIGNIFICANT CHANGE UP (ref 3.5–5.3)
POTASSIUM SERPL-SCNC: 4.3 MMOL/L — SIGNIFICANT CHANGE UP (ref 3.5–5.3)
PROT SERPL-MCNC: 5 GM/DL — LOW (ref 6–8.3)
RBC # BLD: 4.44 M/UL — SIGNIFICANT CHANGE UP (ref 3.8–5.2)
RBC # FLD: 13 % — SIGNIFICANT CHANGE UP (ref 11–15)
SAO2 % BLDA: 97 % — HIGH (ref 92–96)
SODIUM SERPL-SCNC: 147 MMOL/L — HIGH (ref 135–145)
TROPONIN I SERPL-MCNC: 0.77 NG/ML — HIGH (ref 0.01–0.04)
TROPONIN I SERPL-MCNC: 0.93 NG/ML — HIGH (ref 0.01–0.04)
WBC # BLD: 9.3 K/UL — SIGNIFICANT CHANGE UP (ref 3.8–10.5)
WBC # FLD AUTO: 9.3 K/UL — SIGNIFICANT CHANGE UP (ref 3.8–10.5)

## 2017-01-23 PROCEDURE — 99232 SBSQ HOSP IP/OBS MODERATE 35: CPT

## 2017-01-23 PROCEDURE — 71010: CPT | Mod: 26

## 2017-01-23 PROCEDURE — 99233 SBSQ HOSP IP/OBS HIGH 50: CPT

## 2017-01-23 RX ORDER — SODIUM CHLORIDE 9 MG/ML
250 INJECTION INTRAMUSCULAR; INTRAVENOUS; SUBCUTANEOUS ONCE
Qty: 0 | Refills: 0 | Status: COMPLETED | OUTPATIENT
Start: 2017-01-23 | End: 2017-01-23

## 2017-01-23 RX ORDER — SODIUM CHLORIDE 9 MG/ML
1000 INJECTION INTRAMUSCULAR; INTRAVENOUS; SUBCUTANEOUS ONCE
Qty: 0 | Refills: 0 | Status: COMPLETED | OUTPATIENT
Start: 2017-01-23 | End: 2017-01-23

## 2017-01-23 RX ORDER — FUROSEMIDE 40 MG
20 TABLET ORAL ONCE
Qty: 0 | Refills: 0 | Status: COMPLETED | OUTPATIENT
Start: 2017-01-23 | End: 2017-01-23

## 2017-01-23 RX ORDER — IPRATROPIUM/ALBUTEROL SULFATE 18-103MCG
3 AEROSOL WITH ADAPTER (GRAM) INHALATION ONCE
Qty: 0 | Refills: 0 | Status: COMPLETED | OUTPATIENT
Start: 2017-01-23 | End: 2017-01-23

## 2017-01-23 RX ADMIN — Medication 325 MILLIGRAM(S): at 12:23

## 2017-01-23 RX ADMIN — HEPARIN SODIUM 5000 UNIT(S): 5000 INJECTION INTRAVENOUS; SUBCUTANEOUS at 17:59

## 2017-01-23 RX ADMIN — SODIUM CHLORIDE 250 MILLILITER(S): 9 INJECTION INTRAMUSCULAR; INTRAVENOUS; SUBCUTANEOUS at 01:41

## 2017-01-23 RX ADMIN — Medication 20 MILLIGRAM(S): at 08:30

## 2017-01-23 RX ADMIN — SODIUM CHLORIDE 1000 MILLILITER(S): 9 INJECTION INTRAMUSCULAR; INTRAVENOUS; SUBCUTANEOUS at 05:13

## 2017-01-23 RX ADMIN — HEPARIN SODIUM 5000 UNIT(S): 5000 INJECTION INTRAVENOUS; SUBCUTANEOUS at 06:27

## 2017-01-23 RX ADMIN — Medication 3 MILLILITER(S): at 05:39

## 2017-01-23 NOTE — PROGRESS NOTE ADULT - SUBJECTIVE AND OBJECTIVE BOX
Patient seen and examined bedside.  No complaints offered.   She is s/p RRT this am for respiratory failure, HoTN.    T(F): 98.6, Max: 98.6 (01-22 @ 17:20)  HR: 80 (72 - 123)  BP: 87/57 (87/53 - 120/78)  RR: 20 (17 - 22)  SpO2: 99% (91% - 99%)    PHYSICAL EXAM:  General: NAD  Neuro:  Alert & oriented x 3  HEENT: BIPAP in situ  CV: +S1S2 regular rate and rhythm  Lung: good aeration b/l, respirations nonlabored  Abdomen: soft, NTND. Normactive BS  Extremities: 1+ pitting pedal edema b/l  : no suprapubic tenderness. Young catheter indwelling with clear yellow urine in tubing.    LABS:                        13.5   9.3   )-----------( 166      ( 23 Jan 2017 05:37 )             38.4   23 Jan 2017 05:37    147    |  115    |  17     ----------------------------<  163    4.3     |  22     |  0.66     Ca    7.4        23 Jan 2017 05:37    TPro  5.0    /  Alb  1.8    /  TBili  0.4    /  DBili  x      /  AST  21     /  ALT  30     /  AlkPhos  64     23 Jan 2017 05:37    I&O's 120/200cc    CXR 1/23  Cardiomegaly with congestive changes.Bilateral pulmonary infiltrates.   Bilateral pleural effusions. Significant worsening as compared to   1/19/2017.    Impression: 89F admitted with weakness, hypokalemia, urinary retention likely 2/2 immobility, PMH HTN, HLD, oral cancer. S/p RRT for respiratory failure with bilateral pleural effusions, started on BIPAP    Plan:  -continue young catheter, urine output monitoring and trend renal function  -continue all medical management/GI/cardio Follow up. BIPAP. TTE pending  -PO intake encouraged  -will discuss with Dr Arnold for further recommendations: possible TOV if ambulatory

## 2017-01-23 NOTE — PROGRESS NOTE ADULT - SUBJECTIVE AND OBJECTIVE BOX
Chief Complaint:  Patient is a 89y old  Female who presents with a chief complaint of weakness and failure to thrive (19 Jan 2017 05:25)      HPI:  89 year old female with PMH of oral cancer s/p radiation therapy yesterday (has received 5 treatments to date), HTN, HLD and PSH of oral surgery 2/2 to cancer, partial thyroidectomy, and right leg surgery presents to ED complaining of weakness and decreased appetitie. Patient states that due to the radiation she no longer can taste any of her food so she eats very little. She states that she wants nutrition through her veins, she does not want to eat and does not was a NGT for feeding. Admits to 1 episode of diarrhea 2 days ago. Denies fever, chills, sob, chest pain, palpitations, nausea, vomiting, and dysuria. Admits to using a walker for ambulation at home. Patient states she has a home health aid 7 days a week for 6 hours a day, but she lives alone. Patient does not have a list of her medications with her, and is unsure if she has had Afib in the past.   RRT this morning for hypotension and labored breathing, responded to Bipap and IVF.    Allergies:	No Known Allergies:     tion Status not yet specified    Past Medical History:  HTN (hypertension)    Hyperlipidemia    Mouth cancer.    Past Surgical History:  H/O oral surgery    H/O partial thyroidectomy.    Family History:  No pertinent family history in first degree relatives.    Social History:  · Lives With	alone 	  Relevant Family History:     Non-contributory    Review of Systems:  General:  No wt loss, fevers, chills, night sweats  Eyes:  No reported pain  ENT:  No sore throat, pain, runny nose, dysphagia  CV:  No pain, palpitations hypo/hypertension  Resp:  No cough, tachypnea, wheezing. Positive dyspnea  GI:  No pain, nausea, vomiting, diarrhea, constipation  :  No pain, bleeding, incontinence, nocturia  Muscle:  No pain, Positive weakness/falls.  Breast:  No pain, abscess, mass, discharge  Neuro:  No weakness, tingling, memory problems  Psych:  No fatigue, insomnia, mood problems, depression  Endocrine:  No polyuria, polydipsia cold/heat intolerance  Heme:  No petechiae, ecchymosis, easy bruisability  Skin:  No rash, edema    PHYSICAL EXAMINATION:  -----------------------------  T(C): 37.1, Max: 37.1 (01-23 @ 11:13)  HR: 88 (72 - 112)  BP: 104/59 (87/53 - 120/78)  RR: 20 (17 - 22)  SpO2: 97% (91% - 99%)  Wt(kg): --    I & Os for current day (as of 01-23 @ 12:34)  =============================================  IN:    Oral Fluid: 120 ml    Total IN: 120 ml  ---------------------------------------------  OUT:    Indwelling Catheter - Urethral: 200 ml    Total OUT: 200 ml  ---------------------------------------------  Total NET: -80 ml      Weight (kg): 42.5 (01-23 @ 09:48)    Constitutional: well developed, normal appearance, well groomed, well nourished, no deformities and no acute distress.   Eyes: the conjunctiva exhibited no abnormalities and the eyelids demonstrated no xanthelasmas.   HEENT: normal oral mucosa, no oral pallor and no oral cyanosis.   Neck: normal jugular venous A waves present, normal jugular venous V waves present and no jugular venous sterling A waves.   Pulmonary: no respiratory distress, normal respiratory rhythm and effort, no accessory muscle use and lungs were clear to auscultation bilaterally.   Cardiovascular: heart rate and rhythm were normal, normal S1 and S2 and no murmur, gallop, rub, heave or thrill are present.   Abdomen: soft, non-tender, no hepato-splenomegaly and no abdominal mass palpated.   Musculoskeletal: the gait could not be assessed..   Extremities: no clubbing of the fingernails, no localized cyanosis, no petechial hemorrhages and no ischemic changes.   Skin: normal skin color and pigmentation, no rash, no venous stasis, no skin lesions, no skin ulcer and no xanthoma was observed.   Psychiatric: oriented to person, place, and time, the affect was normal, the mood was normal and not feeling anxious.     LABS:   --------  23 Jan 2017 05:37    147    |  115    |  17     ----------------------------<  163    4.3     |  22     |  0.66     Ca    7.4        23 Jan 2017 05:37    TPro  5.0    /  Alb  1.8    /  TBili  0.4    /  DBili  x      /  AST  21     /  ALT  30     /  AlkPhos  64     23 Jan 2017 05:37                         13.5   9.3   )-----------( 166      ( 23 Jan 2017 05:37 )             38.4       01-23 @ 05:37 BNP: 81159 pg/mL    01-23 @ 05:37 CPK total:--, CKMB --, Troponin I - .771 ng/mL<H>  01-23 @ 00:13 CPK total:--, CKMB --, Troponin I - .927 ng/mL<H>  01-22 @ 15:27 CPK total:--, CKMB --, Troponin I - 1.000 ng/mL<H>  01-22 @ 06:32 CPK total:--, CKMB --, Troponin I - 1.120 ng/mL<H> Chief Complaint:  Patient is a 89y old  Female who presents with a chief complaint of weakness and failure to thrive (19 Jan 2017 05:25)      HPI:  89 year old female with PMH of oral cancer s/p radiation therapy yesterday (has received 5 treatments to date), HTN, HLD and PSH of oral surgery 2/2 to cancer, partial thyroidectomy, and right leg surgery presents to ED complaining of weakness and decreased appetitie. Patient states that due to the radiation she no longer can taste any of her food so she eats very little. She states that she wants nutrition through her veins, she does not want to eat and does not was a NGT for feeding. Admits to 1 episode of diarrhea 2 days ago. Denies fever, chills, sob, chest pain, palpitations, nausea, vomiting, and dysuria. Admits to using a walker for ambulation at home. Patient states she has a home health aid 7 days a week for 6 hours a day, but she lives alone.RRT this morning for hypotension and labored breathing, responded to Bipap and IVF. Currently asymptomatic. Granddaughter at bedside.    Allergies:	No Known Allergies:     tion Status not yet specified    Past Medical History:  HTN (hypertension)    Hyperlipidemia    Mouth cancer.    Past Surgical History:  H/O oral surgery    H/O partial thyroidectomy.    Family History:  No pertinent family history in first degree relatives.    Social History:  · Lives With	alone 	  Relevant Family History:     Non-contributory    Review of Systems:  General:  No wt loss, fevers, chills, night sweats  Eyes:  No reported pain  ENT:  No sore throat, pain, runny nose, dysphagia  CV:  No pain, palpitations hypo/hypertension  Resp:  No cough, tachypnea, wheezing. Positive dyspnea  GI:  No pain, nausea, vomiting, diarrhea, constipation  :  No pain, bleeding, incontinence, nocturia  Muscle:  No pain, Positive weakness/falls.  Breast:  No pain, abscess, mass, discharge  Neuro:  No weakness, tingling, memory problems  Psych:  No fatigue, insomnia, mood problems, depression  Endocrine:  No polyuria, polydipsia cold/heat intolerance  Heme:  No petechiae, ecchymosis, easy bruisability  Skin:  No rash, edema    PHYSICAL EXAMINATION:  -----------------------------  T(C): 37.1, Max: 37.1 (01-23 @ 11:13)  HR: 88 (72 - 112)  BP: 104/59 (87/53 - 120/78)  RR: 20 (17 - 22)  SpO2: 97% (91% - 99%)  Wt(kg): --    I & Os for current day (as of 01-23 @ 12:34)  =============================================  IN:    Oral Fluid: 120 ml    Total IN: 120 ml  ---------------------------------------------  OUT:    Indwelling Catheter - Urethral: 200 ml    Total OUT: 200 ml  ---------------------------------------------  Total NET: -80 ml      Weight (kg): 42.5 (01-23 @ 09:48)    Constitutional: Cachexia, mild pallor, no deformities and no acute distress.   Eyes: the conjunctiva exhibited no abnormalities and the eyelids demonstrated no xanthelasmas.   HEENT: normal oral mucosa, and no oral cyanosis.   Neck: normal jugular venous A waves present, normal jugular venous V waves present and no jugular venous sterling A waves.   Pulmonary: no respiratory distress, normal respiratory rhythm and effort, no accessory muscle use and lungs were clear to auscultation bilaterally.   Cardiovascular: heart rate and rhythm were normal, normal S1 and S2 and no murmur, gallop, rub, heave or thrill are present.   Abdomen: soft, non-tender, no hepato-splenomegaly and no abdominal mass palpated.   Musculoskeletal: the gait could not be assessed..   Extremities: no clubbing of the fingernails, no localized cyanosis, no petechial hemorrhages and no ischemic changes.   Skin: normal skin color and pigmentation, no rash, no venous stasis, no skin lesions, no skin ulcer and no xanthoma was observed.   Psychiatric: oriented to person, place, and time, the affect was normal, the mood was normal and not feeling anxious.     LABS:   --------  23 Jan 2017 05:37    147    |  115    |  17     ----------------------------<  163    4.3     |  22     |  0.66     Ca    7.4        23 Jan 2017 05:37    TPro  5.0    /  Alb  1.8    /  TBili  0.4    /  DBili  x      /  AST  21     /  ALT  30     /  AlkPhos  64     23 Jan 2017 05:37                         13.5   9.3   )-----------( 166      ( 23 Jan 2017 05:37 )             38.4       01-23 @ 05:37 BNP: 84847 pg/mL    01-23 @ 05:37 CPK total:--, CKMB --, Troponin I - .771 ng/mL<H>  01-23 @ 00:13 CPK total:--, CKMB --, Troponin I - .927 ng/mL<H>  01-22 @ 15:27 CPK total:--, CKMB --, Troponin I - 1.000 ng/mL<H>  01-22 @ 06:32 CPK total:--, CKMB --, Troponin I - 1.120 ng/mL<H>      ECG. AF, RBBB, nonspecific ST T abnls.

## 2017-01-23 NOTE — PROGRESS NOTE ADULT - SUBJECTIVE AND OBJECTIVE BOX
above noted; s/p rapid response      MEDICATIONS  (STANDING):  heparin  Injectable 5000Unit(s) SubCutaneous every 12 hours  diltiazem    Tablet 90milliGRAM(s) Oral every 6 hours  digoxin     Tablet 0.125milliGRAM(s) Oral daily  aspirin enteric coated 325milliGRAM(s) Oral daily    MEDICATIONS  (PRN):  nitroglycerin     SubLingual 0.4milliGRAM(s) SubLingual every 15 minutes PRN Chest Pain      Allergies    No Known Allergies    Intolerances        Vital Signs Last 24 Hrs  T(C): 37, Max: 37 (01-22 @ 17:20)  T(F): 98.6, Max: 98.6 (01-22 @ 17:20)  HR: 88 (72 - 123)  BP: 96/63 (87/53 - 120/78)  BP(mean): --  RR: 20 (17 - 22)  SpO2: 98% (91% - 99%)    PHYSICAL EXAM:  General: NAD.  CVS: S1, S2  Chest: air entry bilaterally present  Abd: BS present, soft, non-tender      LABS:                        13.5   9.3   )-----------( 166      ( 23 Jan 2017 05:37 )             38.4     23 Jan 2017 05:37    147    |  115    |  17     ----------------------------<  163    4.3     |  22     |  0.66     Ca    7.4        23 Jan 2017 05:37    TPro  5.0    /  Alb  1.8    /  TBili  0.4    /  DBili  x      /  AST  21     /  ALT  30     /  AlkPhos  64     23 Jan 2017 05:37        labs stable  hold off on peg  encourage PO intake

## 2017-01-23 NOTE — PROGRESS NOTE ADULT - SUBJECTIVE AND OBJECTIVE BOX
Patient is a 89y old  Female who presents with a chief complaint of weakness and failure to thrive (19 Jan 2017 05:25)      INTERVAL HPI/OVERNIGHT EVENTS:    MEDICATIONS  (STANDING):  heparin  Injectable 5000Unit(s) SubCutaneous every 12 hours  diltiazem    Tablet 90milliGRAM(s) Oral every 6 hours  digoxin     Tablet 0.125milliGRAM(s) Oral daily  aspirin enteric coated 325milliGRAM(s) Oral daily    MEDICATIONS  (PRN):  nitroglycerin     SubLingual 0.4milliGRAM(s) SubLingual every 15 minutes PRN Chest Pain      Allergies    No Known Allergies    Intolerances        REVIEW OF SYSTEMS:  CONSTITUTIONAL: No fever, weight loss, or fatigue  EYES: No eye pain, visual disturbances, or discharge  ENMT:  No difficulty hearing, tinnitus, vertigo; No sinus or throat pain  NECK: No pain or stiffness  BREASTS: No pain, masses, or nipple discharge  RESPIRATORY: No cough, wheezing, chills or hemoptysis; No shortness of breath  CARDIOVASCULAR: No chest pain, palpitations, dizziness, or leg swelling  GASTROINTESTINAL: No abdominal or epigastric pain. No nausea, vomiting, or hematemesis; No diarrhea or constipation. No melena or hematochezia.  GENITOURINARY: No dysuria, frequency, hematuria, or incontinence  NEUROLOGICAL: No headaches, memory loss, loss of strength, numbness, or tremors  SKIN: No itching, burning, rashes, or lesions   LYMPH NODES: No enlarged glands  ENDOCRINE: No heat or cold intolerance; No hair loss  MUSCULOSKELETAL: No joint pain or swelling; No muscle, back, or extremity pain  PSYCHIATRIC: No depression, anxiety, mood swings, or difficulty sleeping  HEME/LYMPH: No easy bruising, or bleeding gums  ALLERGY AND IMMUNOLOGIC: No hives or eczema    Vital Signs Last 24 Hrs  T(C): 37.1, Max: 37.1 (01-23 @ 11:13)  T(F): 98.8, Max: 98.8 (01-23 @ 11:13)  HR: 80 (72 - 112)  BP: 104/59 (87/53 - 120/78)  BP(mean): --  RR: 20 (17 - 22)  SpO2: 95% (91% - 99%)    PHYSICAL EXAM:  GENERAL: NAD, well-groomed, well-developed  HEAD:  Atraumatic, Normocephalic  EYES: EOMI, PERRLA, conjunctiva and sclera clear  ENMT: No tonsillar erythema, exudates, or enlargement; Moist mucous membranes, Good dentition, No lesions  NECK: Supple, No JVD, Normal thyroid  NERVOUS SYSTEM:  Alert & Oriented X3, Good concentration; Motor Strength 5/5 B/L upper and lower extremities; DTRs 2+ intact and symmetric  CHEST/LUNG: Clear to percussion bilaterally; No rales, rhonchi, wheezing, or rubs  HEART: Regular rate and rhythm; No murmurs, rubs, or gallops  ABDOMEN: Soft, Nontender, Nondistended; Bowel sounds present  EXTREMITIES:  2+ Peripheral Pulses, No clubbing, cyanosis, or edema  LYMPH: No lymphadenopathy noted  SKIN: No rashes or lesions    LABS:                        13.5   9.3   )-----------( 166      ( 23 Jan 2017 05:37 )             38.4     23 Jan 2017 05:37    147    |  115    |  17     ----------------------------<  163    4.3     |  22     |  0.66     Ca    7.4        23 Jan 2017 05:37    TPro  5.0    /  Alb  1.8    /  TBili  0.4    /  DBili  x      /  AST  21     /  ALT  30     /  AlkPhos  64     23 Jan 2017 05:37        CAPILLARY BLOOD GLUCOSE      RADIOLOGY & ADDITIONAL TESTS:    Imaging Personally Reviewed:  [ ] YES  [ ] NO    Consultant(s) Notes Reviewed:  [ ] YES  [ ] NO    Care Discussed with Consultants/Other Providers [ ] YES  [ ] NO

## 2017-01-23 NOTE — CHART NOTE - NSCHARTNOTEFT_GEN_A_CORE
rapid response called    Called by Rn that patient became hypotensive and desaturated on venti mask 40%. Patient seen and examined at bedside. Patient is breathing fast with rate 36 .  vs 87/53, 82, 36  PO 88% on venti mask  Gen Alert, awake and Oriented x 3   Lungs  rales at bases b/l  cv S1 S2 no murmur appreciated  abd soft , non tender, non distended , bs in all 4 quadrants  ext pvd changes no calf tenderness                        13.5   9.3   )-----------( 166      ( 23 Jan 2017 05:37 )             38.4 rapid response called    Called by Rn that patient became hypotensive and desaturated on venti mask 40%. Patient seen and examined at bedside. Patient is breathing fast with rate 36 .  vs 87/53, 82, 36  PO 88% on venti mask  Gen Alert, awake and Oriented x 3   Lungs  rales at bases b/l  cv S1 S2 no murmur appreciated  abd soft , non tender, non distended , bs in all 4 quadrants  ext pvd changes no calf tenderness                        13.5   9.3   )-----------( 166      ( 23 Jan 2017 05:37 )               38.4    Creatine Kinase, Serum (01.23.17 @ 05:37)    Creatine Kinase, Serum: 28 U/L  Troponin I, Serum (01.23.17 @ 05:37)    Troponin I, Serum: .771: The new reference range for Troponin-I performed on the Siemens Vista  system is 0.015-0.045 ng/mL, which includes the 99th percentile of a  healthy reference population. Studies have shown that elevated troponin  levels above the 99th percentile cuto.771: ff are associated with an increased  risk for adverse cardiac events, with the risk increasing as troponin  levels increase. As per a joint committee of the American College of  Cardiology and European Society of Cardiology, diagnosis of classic MI is.771:   based upon the detection of a rise or fall of cardiac troponin values,  with at least one value above the 99th percentile upper reference limit,  in the appropriate clinical context.  Troponin-I (ng/mL) Interpretation  0.00-0.045 Normal range (inclu.771: devaughn the 99th percentile of a healthy  reference population)  >0.045 Elevated troponin level indicating increased risk  Note: Troponin-I and Troponin-T cannot be used interchangeably in serial  measurements. Minimally elevated Troponin results should b.771: e interpreted  in the context of clinical findings and risk factors. ng/mL  Comprehensive Metabolic Panel (01.23.17 @ 05:37)    Sodium, Serum: 147 mmol/L    Potassium, Serum: 4.3 mmol/L    Chloride, Serum: 115 mmol/L    Carbon Dioxide, Serum: 22 mmol/L    Anion Gap, Serum: 10 mmol/L    Blood Urea Nitrogen, Serum: 17 mg/dL    Creatinine, Serum: 0.66 mg/dL    Glucose, Serum: 163 mg/dL    Calcium, Total Serum: 7.4 mg/dL    Protein Total, Serum: 5.0 gm/dL    Albumin, Serum: 1.8 g/dL    Bilirubin Total, Serum: 0.4 mg/dL    Alkaline Phosphatase, Serum: 64 U/L    Aspartate Aminotransferase (AST/SGOT): 21 U/L    Alanine Aminotransferase (ALT/SGPT): 30 U/L    eGFR if Non : 78: Interpretative comment  The units for eGFR are ml/min/1.73m2 (normalized body surface area). The  eGFR is calculated from a serum creatinine using the CKD-EPI equation.  Other variables required for calculation are race, age and sex. Among  patients w78: ith chronic kidney disease (CKD), the eGFR is useful in  determining the stage of disease according to KDOQI CKD classification.  All eGFR results are reported numerically with the following  interpretation.          GFR                    With78:                  Without     (ml/min/1.73 m2)    Kidney Damage       Kidney Damage        >= 90                    Stage 1                     Normal        60-89                    Stage 2                     Decreased GFR        :      Stage 3                     Stage 3        15-29                    Stage 4                     Stage 4        < 15                      Stage 5                     Stage 5  Each stage of CKD assumes that the associated GFR level has been in  eff78: ect for at least 3 months. Determination of stages one and two (with  eGFR > 59 ml/min/m2) requires estimation of kidney damage for at least 3  months as defined by structural or functional abnormalities.  Limitations: All estimates of GFR will be les78: s accurate for patients at  extremes of muscle mass (including but not limited to frail elderly,  critically ill, or cancer patients), those with unusual diets, and those  with conditions associated with reduced secretion or extrarenal  elimination of78:  creatinine. The eGFR equation is not recommended for use  in patients with unstable creatinine levels. mL/min/1.73M2    eGFR if African American: 90 mL/min/1.73M2    A/P  89 year old female with PMH of oral cancer s/p radiation therapy yesterday (has received 5 treatments to date), HTN, HLD and PSH of oral surgery 2/2 to cancer, partial thyroidectomy, and right leg surgery presents to ED complaining of weakness and decreased appetitie. Patient states that due to the radiation she no longer can taste any of her food so she eats very little. She states that she wants nutrition through her veins, she does not want to eat and does not was a NGT for feeding. Admits to 1 episode of diarrhea 2 days ago. Denies fever, chills, sob, chest pain, palpitations, nausea, vomiting, and dysuria. Admits to using a walker for ambulation at home. Patient admitted for failure to thrive;    Will give bolus of Ns 400cc   duoneb x 1   repeat labs  bipap 12/5 and titrate and try to wean patient in am  previous ekg reviewed byu Dr barrientos  reviewed by Dr Ayala

## 2017-01-23 NOTE — CHART NOTE - NSCHARTNOTEFT_GEN_A_CORE
Rapid Response Note  Called by Rn due to hypotension and hypoxia. Patient seen and examined at bedside.

## 2017-01-24 DIAGNOSIS — J96.01 ACUTE RESPIRATORY FAILURE WITH HYPOXIA: ICD-10-CM

## 2017-01-24 LAB
ALBUMIN SERPL ELPH-MCNC: 1.8 G/DL — LOW (ref 3.3–5)
ALP SERPL-CCNC: 68 U/L — SIGNIFICANT CHANGE UP (ref 40–120)
ALT FLD-CCNC: 34 U/L — SIGNIFICANT CHANGE UP (ref 12–78)
ANION GAP SERPL CALC-SCNC: 9 MMOL/L — SIGNIFICANT CHANGE UP (ref 5–17)
AST SERPL-CCNC: 14 U/L — LOW (ref 15–37)
BASOPHILS # BLD AUTO: 0 K/UL — SIGNIFICANT CHANGE UP (ref 0–0.2)
BASOPHILS NFR BLD AUTO: 0.2 % — SIGNIFICANT CHANGE UP (ref 0–2)
BILIRUB SERPL-MCNC: 0.6 MG/DL — SIGNIFICANT CHANGE UP (ref 0.2–1.2)
BUN SERPL-MCNC: 19 MG/DL — SIGNIFICANT CHANGE UP (ref 7–23)
CALCIUM SERPL-MCNC: 7.8 MG/DL — LOW (ref 8.5–10.1)
CHLORIDE SERPL-SCNC: 115 MMOL/L — HIGH (ref 96–108)
CK MB BLD-MCNC: 9.5 % — HIGH (ref 0–3.5)
CK MB CFR SERPL CALC: 2 NG/ML — SIGNIFICANT CHANGE UP (ref 0.5–3.6)
CK SERPL-CCNC: 21 U/L — LOW (ref 26–192)
CO2 SERPL-SCNC: 23 MMOL/L — SIGNIFICANT CHANGE UP (ref 22–31)
CREAT SERPL-MCNC: 0.7 MG/DL — SIGNIFICANT CHANGE UP (ref 0.5–1.3)
CULTURE RESULTS: SIGNIFICANT CHANGE UP
CULTURE RESULTS: SIGNIFICANT CHANGE UP
EOSINOPHIL # BLD AUTO: 0 K/UL — SIGNIFICANT CHANGE UP (ref 0–0.5)
EOSINOPHIL NFR BLD AUTO: 0 % — SIGNIFICANT CHANGE UP (ref 0–6)
GLUCOSE SERPL-MCNC: 119 MG/DL — HIGH (ref 70–99)
HCT VFR BLD CALC: 40.3 % — SIGNIFICANT CHANGE UP (ref 34.5–45)
HGB BLD-MCNC: 14 G/DL — SIGNIFICANT CHANGE UP (ref 11.5–15.5)
LACTATE SERPL-SCNC: 1.5 MMOL/L — SIGNIFICANT CHANGE UP (ref 0.7–2)
LYMPHOCYTES # BLD AUTO: 0.6 K/UL — LOW (ref 1–3.3)
LYMPHOCYTES # BLD AUTO: 5.1 % — LOW (ref 13–44)
MCHC RBC-ENTMCNC: 30.7 PG — SIGNIFICANT CHANGE UP (ref 27–34)
MCHC RBC-ENTMCNC: 34.7 GM/DL — SIGNIFICANT CHANGE UP (ref 32–36)
MCV RBC AUTO: 88.5 FL — SIGNIFICANT CHANGE UP (ref 80–100)
MONOCYTES # BLD AUTO: 0.5 K/UL — SIGNIFICANT CHANGE UP (ref 0–0.9)
MONOCYTES NFR BLD AUTO: 3.7 % — SIGNIFICANT CHANGE UP (ref 2–14)
NEUTROPHILS # BLD AUTO: 11.6 K/UL — HIGH (ref 1.8–7.4)
NEUTROPHILS NFR BLD AUTO: 91.1 % — HIGH (ref 43–77)
PLATELET # BLD AUTO: 203 K/UL — SIGNIFICANT CHANGE UP (ref 150–400)
POTASSIUM SERPL-MCNC: 4.5 MMOL/L — SIGNIFICANT CHANGE UP (ref 3.5–5.3)
POTASSIUM SERPL-SCNC: 4.5 MMOL/L — SIGNIFICANT CHANGE UP (ref 3.5–5.3)
PROCALCITONIN SERPL-MCNC: 0.24 NG/ML — HIGH (ref 0–0.05)
PROT SERPL-MCNC: 5.3 GM/DL — LOW (ref 6–8.3)
RBC # BLD: 4.56 M/UL — SIGNIFICANT CHANGE UP (ref 3.8–5.2)
RBC # FLD: 13.8 % — SIGNIFICANT CHANGE UP (ref 11–15)
SODIUM SERPL-SCNC: 147 MMOL/L — HIGH (ref 135–145)
SPECIMEN SOURCE: SIGNIFICANT CHANGE UP
SPECIMEN SOURCE: SIGNIFICANT CHANGE UP
TROPONIN I SERPL-MCNC: 0.55 NG/ML — HIGH (ref 0.01–0.04)
WBC # BLD: 12.8 K/UL — HIGH (ref 3.8–10.5)
WBC # FLD AUTO: 12.8 K/UL — HIGH (ref 3.8–10.5)

## 2017-01-24 PROCEDURE — 99233 SBSQ HOSP IP/OBS HIGH 50: CPT

## 2017-01-24 RX ORDER — LIDOCAINE 4 G/100G
1 CREAM TOPICAL ONCE
Qty: 0 | Refills: 0 | Status: COMPLETED | OUTPATIENT
Start: 2017-01-24 | End: 2017-01-24

## 2017-01-24 RX ORDER — DIGOXIN 250 MCG
0.25 TABLET ORAL ONCE
Qty: 0 | Refills: 0 | Status: COMPLETED | OUTPATIENT
Start: 2017-01-24 | End: 2017-01-24

## 2017-01-24 RX ORDER — ACETAMINOPHEN 500 MG
650 TABLET ORAL ONCE
Qty: 0 | Refills: 0 | Status: COMPLETED | OUTPATIENT
Start: 2017-01-24 | End: 2017-01-24

## 2017-01-24 RX ADMIN — HEPARIN SODIUM 5000 UNIT(S): 5000 INJECTION INTRAVENOUS; SUBCUTANEOUS at 06:03

## 2017-01-24 RX ADMIN — Medication 325 MILLIGRAM(S): at 13:44

## 2017-01-24 RX ADMIN — LIDOCAINE 1 PATCH: 4 CREAM TOPICAL at 14:54

## 2017-01-24 RX ADMIN — HEPARIN SODIUM 5000 UNIT(S): 5000 INJECTION INTRAVENOUS; SUBCUTANEOUS at 18:24

## 2017-01-24 RX ADMIN — Medication 650 MILLIGRAM(S): at 04:20

## 2017-01-24 RX ADMIN — Medication 0.25 MILLIGRAM(S): at 04:04

## 2017-01-24 RX ADMIN — LIDOCAINE 1 PATCH: 4 CREAM TOPICAL at 01:38

## 2017-01-24 RX ADMIN — Medication 650 MILLIGRAM(S): at 03:59

## 2017-01-24 NOTE — PROGRESS NOTE ADULT - SUBJECTIVE AND OBJECTIVE BOX
Long conversation with pt and daughter about feeding tube.  Pt is alert and will think about it.  Oral nutrition has been encouraged    MEDICATIONS  (STANDING):  heparin  Injectable 5000Unit(s) SubCutaneous every 12 hours  digoxin     Tablet 0.125milliGRAM(s) Oral daily  aspirin enteric coated 325milliGRAM(s) Oral daily  diltiazem    Tablet 30milliGRAM(s) Oral four times a day    MEDICATIONS  (PRN):  nitroglycerin     SubLingual 0.4milliGRAM(s) SubLingual every 15 minutes PRN Chest Pain      Allergies    No Known Allergies    Intolerances        Vital Signs Last 24 Hrs  T(C): 37.7, Max: 37.7 (01-24 @ 05:41)  T(F): 99.8, Max: 99.8 (01-24 @ 05:41)  HR: 89 (86 - 128)  BP: 107/73 (107/73 - 116/76)  BP(mean): --  RR: 17 (16 - 18)  SpO2: 97% (96% - 100%)    PHYSICAL EXAM:  General: NAD.  CVS: S1, S2  Chest: air entry bilaterally present  Abd: BS present, soft, non-tender      LABS:                        14.0   12.8  )-----------( 203      ( 24 Jan 2017 07:03 )             40.3     24 Jan 2017 07:03    147    |  115    |  19     ----------------------------<  119    4.5     |  23     |  0.70     Ca    7.8        24 Jan 2017 07:03    TPro  5.3    /  Alb  1.8    /  TBili  0.6    /  DBili  x      /  AST  14     /  ALT  34     /  AlkPhos  68     24 Jan 2017 07:03        Labs stable  encourage Po diet  pt to decide about PEG

## 2017-01-24 NOTE — CHART NOTE - NSCHARTNOTEFT_GEN_A_CORE
Rapid Response Note    RN called rapid response due to 's  EKG A flutter and RBBB. Patient is alert, awake and oriented x 3. Patient is complaining of chest pain and palpitations. Patient felt warm and found to have a temp 101.8 rectally. Denies nausea, vomiting, cold, cough, sob, diarrhea, constipation, or abd pain.    VS at time of /74 154 20 101.8  Gen Alert, Awake and Oriented x 3   CV irregular regular rate,  S1S2 no murmur appreciated  Lungs decrease air entry at the bases  Abd soft nt nd Bs in all 4 quadrants  Ext min edema  Neuro Alert, Awake and oriented x 3    A/P 89 year old female with oral cancer admitted secondary to anorexia, failure to thrive and weakness. Patient found to making trop which were trending downward . Last .Troponin I, Serum: .771:  Will draw CBC, CMP, blood culture x 2 lactate, trop, CK, ckmb  Tylenol given 650 mg for fever  IV fluids Ns 250 x 2 hrs  digoxin 0.125 mg IVP  cardiazem 5 mg IVP  After cardiazem repeat vitals  116/78 120 and digoxin given and repeat vitals 111/63 99  RRR called off around 4;10 am final vital 104/73 103 ,20  Dr Mckinney agreed .

## 2017-01-24 NOTE — PROGRESS NOTE ADULT - SUBJECTIVE AND OBJECTIVE BOX
Patient is a 89y old  Female who presents with a chief complaint of weakness and failure to thrive (19 Jan 2017 05:25)      INTERVAL HPI/OVERNIGHT EVENTS:    MEDICATIONS  (STANDING):  heparin  Injectable 5000Unit(s) SubCutaneous every 12 hours  digoxin     Tablet 0.125milliGRAM(s) Oral daily  aspirin enteric coated 325milliGRAM(s) Oral daily  diltiazem    Tablet 30milliGRAM(s) Oral four times a day    MEDICATIONS  (PRN):  nitroglycerin     SubLingual 0.4milliGRAM(s) SubLingual every 15 minutes PRN Chest Pain      Allergies    No Known Allergies    Intolerances        REVIEW OF SYSTEMS:  CONSTITUTIONAL: No fever, weight loss, or fatigue  EYES: No eye pain, visual disturbances, or discharge  ENMT:  No difficulty hearing, tinnitus, vertigo; No sinus or throat pain  NECK: No pain or stiffness  BREASTS: No pain, masses, or nipple discharge  RESPIRATORY: No cough, wheezing, chills or hemoptysis; No shortness of breath  CARDIOVASCULAR: No chest pain, palpitations, dizziness, or leg swelling  GASTROINTESTINAL: No abdominal or epigastric pain. No nausea, vomiting, or hematemesis; No diarrhea or constipation. No melena or hematochezia.  GENITOURINARY: No dysuria, frequency, hematuria, or incontinence  NEUROLOGICAL: No headaches, memory loss, loss of strength, numbness, or tremors  SKIN: No itching, burning, rashes, or lesions   LYMPH NODES: No enlarged glands  ENDOCRINE: No heat or cold intolerance; No hair loss  MUSCULOSKELETAL: No joint pain or swelling; No muscle, back, or extremity pain  PSYCHIATRIC: No depression, anxiety, mood swings, or difficulty sleeping  HEME/LYMPH: No easy bruising, or bleeding gums  ALLERGY AND IMMUNOLOGIC: No hives or eczema    Vital Signs Last 24 Hrs  T(C): 37.1, Max: 37.7 (01-24 @ 05:41)  T(F): 98.8, Max: 99.8 (01-24 @ 05:41)  HR: 117 (89 - 128)  BP: 110/69 (107/73 - 114/70)  BP(mean): --  RR: 17 (16 - 17)  SpO2: 98% (96% - 100%)    PHYSICAL EXAM:  GENERAL: NAD, well-groomed, well-developed  HEAD:  Atraumatic, Normocephalic  EYES: EOMI, PERRLA, conjunctiva and sclera clear  ENMT: No tonsillar erythema, exudates, or enlargement; Moist mucous membranes, Good dentition, No lesions  NECK: Supple, No JVD, Normal thyroid  NERVOUS SYSTEM:  Alert & Oriented X3, Good concentration; Motor Strength 5/5 B/L upper and lower extremities; DTRs 2+ intact and symmetric  CHEST/LUNG: Clear to percussion bilaterally; No rales, rhonchi, wheezing, or rubs  HEART: Regular rate and rhythm; No murmurs, rubs, or gallops  ABDOMEN: Soft, Nontender, Nondistended; Bowel sounds present  EXTREMITIES:  2+ Peripheral Pulses, No clubbing, cyanosis, or edema  LYMPH: No lymphadenopathy noted  SKIN: No rashes or lesions    LABS:                        14.0   12.8  )-----------( 203      ( 24 Jan 2017 07:03 )             40.3     24 Jan 2017 07:03    147    |  115    |  19     ----------------------------<  119    4.5     |  23     |  0.70     Ca    7.8        24 Jan 2017 07:03    TPro  5.3    /  Alb  1.8    /  TBili  0.6    /  DBili  x      /  AST  14     /  ALT  34     /  AlkPhos  68     24 Jan 2017 07:03        CAPILLARY BLOOD GLUCOSE      RADIOLOGY & ADDITIONAL TESTS:    Imaging Personally Reviewed:  [ ] YES  [ ] NO    Consultant(s) Notes Reviewed:  [ ] YES  [ ] NO    Care Discussed with Consultants/Other Providers [ ] YES  [ ] NO Patient is a 89y old  Female who presents with a chief complaint of weakness and failure to thrive (19 Jan 2017 05:25)      INTERVAL HPI/OVERNIGHT EVENTS:  patient seen and examined earlier today. Patient eating without difficulty, however complained that the pancakes were cold and did not have much taste.  Patient encouraged to drink glucerna and snack frequently.  Patient denies cp, shortness of breath palpitations etc.      MEDICATIONS  (STANDING):  heparin  Injectable 5000Unit(s) SubCutaneous every 12 hours  digoxin     Tablet 0.125milliGRAM(s) Oral daily  aspirin enteric coated 325milliGRAM(s) Oral daily  diltiazem    Tablet 30milliGRAM(s) Oral four times a day    MEDICATIONS  (PRN):  nitroglycerin     SubLingual 0.4milliGRAM(s) SubLingual every 15 minutes PRN Chest Pain      Allergies    No Known Allergies    Intolerances        REVIEW OF SYSTEMS:  CONSTITUTIONAL: No fever, weight loss, or fatigue  EYES: No eye pain, visual disturbances, or discharge  ENMT:  No difficulty hearing, tinnitus, vertigo; notes mouth discomfort, food does not have taste.  NECK: No pain or stiffness  BREASTS: No pain, masses, or nipple discharge  RESPIRATORY: No cough, wheezing, chills or hemoptysis; No shortness of breath  CARDIOVASCULAR: No chest pain, palpitations, dizziness, or leg swelling  GASTROINTESTINAL: No abdominal or epigastric pain. No nausea, vomiting, or hematemesis; No diarrhea or constipation. No melena or hematochezia.  GENITOURINARY: No dysuria, frequency, hematuria, or incontinence  NEUROLOGICAL: No headaches, memory loss, loss of strength, numbness, or tremors  SKIN: No itching, burning, rashes, or lesions   LYMPH NODES: No enlarged glands  ENDOCRINE: No heat or cold intolerance; No hair loss  MUSCULOSKELETAL: No joint pain or swelling; No muscle, back, or extremity pain  PSYCHIATRIC: No depression, anxiety, or mood swings  HEME/LYMPH: No easy bruising, or bleeding gums  ALLERGY AND IMMUNOLOGIC: No hives or eczema    Vital Signs Last 24 Hrs  T(C): 37.1, Max: 37.7 (01-24 @ 05:41)  T(F): 98.8, Max: 99.8 (01-24 @ 05:41)  HR: 117 (89 - 128)  BP: 110/69 (107/73 - 114/70)  BP(mean): --  RR: 17 (16 - 17)  SpO2: 98% (96% - 100%)    PHYSICAL EXAM:  GENERAL: NAD, well-groomed, well-developed  HEAD:  Atraumatic, Normocephalic  EYES: EOMI, PERRLA, conjunctiva and sclera clear  ENMT: No tonsillar erythema, exudates, or enlargement; Moist mucous membranes  NECK: Supple, No JVD  NERVOUS SYSTEM:  Alert & Oriented X3, Good concentration; Motor Strength 5/5 B/L upper and lower extremities; DTRs 2+ intact and symmetric  CHEST/LUNG: Clear to auscultation bilaterally; +kyphosis No rales, rhonchi, wheezing, or rubs  HEART: Regular rate and rhythm; No murmurs, rubs, or gallops  ABDOMEN: Soft, Nontender, Nondistended; Bowel sounds present  EXTREMITIES:  2+ Peripheral Pulses, No clubbing or cyanosis, trace bilateral edema  LYMPH: No lymphadenopathy noted  SKIN: No rashes or lesions    LABS:                        14.0   12.8  )-----------( 203      ( 24 Jan 2017 07:03 )             40.3     24 Jan 2017 07:03    147    |  115    |  19     ----------------------------<  119    4.5     |  23     |  0.70     Ca    7.8        24 Jan 2017 07:03    TPro  5.3    /  Alb  1.8    /  TBili  0.6    /  DBili  x      /  AST  14     /  ALT  34     /  AlkPhos  68     24 Jan 2017 07:03        CAPILLARY BLOOD GLUCOSE      RADIOLOGY & ADDITIONAL TESTS:    Imaging Personally Reviewed:  [x ] YES  [ ] NO    Consultant(s) Notes Reviewed:  [x YES  [ ] NO    Care Discussed with Consultants/Other Providers [x] YES  [ ] NO

## 2017-01-24 NOTE — PROGRESS NOTE ADULT - PROBLEM SELECTOR PLAN 2
B.P. 92/70     IVF    NS    250   ml    bolus    over    30   mins  , repeat   B.P. s/p multiple fluid boluses  continue cardizem for afib with hold parameters

## 2017-01-24 NOTE — PROGRESS NOTE ADULT - SUBJECTIVE AND OBJECTIVE BOX
89y Female admitted with FAILURE TO THRIVE IN ADULT AND HYPOKALEMIA  GENERAL WEAKNESS, WITH NAUSEA    Patient seen and examined bedside resting comfortably.  No complaints offered.   Denies abdominal or suprapubic pain.    T(F): 99.8, Max: 99.8 (01-24 @ 05:41)  HR: 89 (80 - 128)  BP: 107/73 (87/57 - 116/76)  RR: 17 (16 - 20)SpO2: 97% (95% - 100%)  Wt(kg): --  CAPILLARY BLOOD GLUCOSE      PHYSICAL EXAM:  General: NAD, WDWN.   Neuro:  Alert & oriented x 3  HEENT: on BIPAP   CV: +S1+S2 regular rate and rhythm  Lung: clear to ausculation bilaterally, respirations nonlabored, good inspiratory effort  Abdomen: soft, NTND. Normoactive BS. No tenderness to palpation  Extremities: no calf tenderness. 1+ pitting edema b/l   : No CVA or SP tenderness. Indwelling MADRID catheter in place. Urine adolfo color and clear in tubing. 800 mL/24hr    LABS:                        14.0   12.8  )-----------( 203      ( 24 Jan 2017 07:03 )             40.3     24 Jan 2017 07:03    147    |  115    |  19     ----------------------------<  119    4.5     |  23     |  0.70     Ca    7.8        24 Jan 2017 07:03    TPro  5.3    /  Alb  1.8    /  TBili  0.6    /  DBili  x      /  AST  14     /  ALT  34     /  AlkPhos  68     24 Jan 2017 07:03        Impression: 89F admitted with weakness, hypokalemia, urinary retention likely 2/2 immobility, PMH HTN, HLD, oral cancer. S/p RRT for respiratory failure with bilateral pleural effusions. Now with leukocytosis.    PMH Hyperlipidemia  HTN (hypertension)  Mouth cancer      Plan:    -continue VTE prophylaxis: heparin and aspirin   -Increase activity with PT, OOB, Ambulate  -TOV when ambulatory   -continue BIPAP   -f/u AM labs  -will discuss with surgical attending

## 2017-01-24 NOTE — PROGRESS NOTE ADULT - PROBLEM SELECTOR PLAN 1
change from cardizem cd 240 to 30 mg po q6h with hold parameters for bp and heart rate. change from cardizem cd 240 to 30 mg po q6h with hold parameters for bp and heart rate.  await tte

## 2017-01-25 LAB
ANION GAP SERPL CALC-SCNC: 7 MMOL/L — SIGNIFICANT CHANGE UP (ref 5–17)
BASOPHILS # BLD AUTO: 0 K/UL — SIGNIFICANT CHANGE UP (ref 0–0.2)
BASOPHILS NFR BLD AUTO: 0.4 % — SIGNIFICANT CHANGE UP (ref 0–2)
BUN SERPL-MCNC: 27 MG/DL — HIGH (ref 7–23)
CALCIUM SERPL-MCNC: 8.1 MG/DL — LOW (ref 8.5–10.1)
CHLORIDE SERPL-SCNC: 116 MMOL/L — HIGH (ref 96–108)
CO2 SERPL-SCNC: 23 MMOL/L — SIGNIFICANT CHANGE UP (ref 22–31)
CREAT SERPL-MCNC: 0.62 MG/DL — SIGNIFICANT CHANGE UP (ref 0.5–1.3)
EOSINOPHIL # BLD AUTO: 0.1 K/UL — SIGNIFICANT CHANGE UP (ref 0–0.5)
EOSINOPHIL NFR BLD AUTO: 0.6 % — SIGNIFICANT CHANGE UP (ref 0–6)
GLUCOSE SERPL-MCNC: 96 MG/DL — SIGNIFICANT CHANGE UP (ref 70–99)
HCT VFR BLD CALC: 39.3 % — SIGNIFICANT CHANGE UP (ref 34.5–45)
HGB BLD-MCNC: 13.8 G/DL — SIGNIFICANT CHANGE UP (ref 11.5–15.5)
LYMPHOCYTES # BLD AUTO: 1.1 K/UL — SIGNIFICANT CHANGE UP (ref 1–3.3)
LYMPHOCYTES # BLD AUTO: 13.8 % — SIGNIFICANT CHANGE UP (ref 13–44)
MCHC RBC-ENTMCNC: 30.7 PG — SIGNIFICANT CHANGE UP (ref 27–34)
MCHC RBC-ENTMCNC: 35.3 GM/DL — SIGNIFICANT CHANGE UP (ref 32–36)
MCV RBC AUTO: 87.1 FL — SIGNIFICANT CHANGE UP (ref 80–100)
MONOCYTES # BLD AUTO: 0.5 K/UL — SIGNIFICANT CHANGE UP (ref 0–0.9)
MONOCYTES NFR BLD AUTO: 6.1 % — SIGNIFICANT CHANGE UP (ref 2–14)
NEUTROPHILS # BLD AUTO: 6.3 K/UL — SIGNIFICANT CHANGE UP (ref 1.8–7.4)
NEUTROPHILS NFR BLD AUTO: 79.2 % — HIGH (ref 43–77)
PLATELET # BLD AUTO: 202 K/UL — SIGNIFICANT CHANGE UP (ref 150–400)
POTASSIUM SERPL-MCNC: 4.7 MMOL/L — SIGNIFICANT CHANGE UP (ref 3.5–5.3)
POTASSIUM SERPL-SCNC: 4.7 MMOL/L — SIGNIFICANT CHANGE UP (ref 3.5–5.3)
RBC # BLD: 4.51 M/UL — SIGNIFICANT CHANGE UP (ref 3.8–5.2)
RBC # FLD: 13.1 % — SIGNIFICANT CHANGE UP (ref 11–15)
SODIUM SERPL-SCNC: 146 MMOL/L — HIGH (ref 135–145)
TROPONIN I SERPL-MCNC: 0.41 NG/ML — HIGH (ref 0.01–0.04)
WBC # BLD: 7.9 K/UL — SIGNIFICANT CHANGE UP (ref 3.8–10.5)
WBC # FLD AUTO: 7.9 K/UL — SIGNIFICANT CHANGE UP (ref 3.8–10.5)

## 2017-01-25 PROCEDURE — 99233 SBSQ HOSP IP/OBS HIGH 50: CPT

## 2017-01-25 PROCEDURE — 93306 TTE W/DOPPLER COMPLETE: CPT | Mod: 26

## 2017-01-25 RX ORDER — DIGOXIN 250 MCG
1 TABLET ORAL
Qty: 0 | Refills: 0 | COMMUNITY
Start: 2017-01-25

## 2017-01-25 RX ADMIN — Medication 0.12 MILLIGRAM(S): at 05:33

## 2017-01-25 RX ADMIN — HEPARIN SODIUM 5000 UNIT(S): 5000 INJECTION INTRAVENOUS; SUBCUTANEOUS at 05:33

## 2017-01-25 RX ADMIN — Medication 325 MILLIGRAM(S): at 11:18

## 2017-01-25 RX ADMIN — HEPARIN SODIUM 5000 UNIT(S): 5000 INJECTION INTRAVENOUS; SUBCUTANEOUS at 18:21

## 2017-01-25 NOTE — DISCHARGE NOTE ADULT - MEDICATION SUMMARY - MEDICATIONS TO TAKE
I will START or STAY ON the medications listed below when I get home from the hospital:    Aspirin Enteric Coated 81 mg oral delayed release tablet  -- 1 tab(s) by mouth once a day  -- Indication: For Afib    digoxin 125 mcg (0.125 mg) oral tablet  -- 1 tab(s) by mouth once a day  -- Indication: For Afib    diltiaZEM 180 mg/24 hours oral tablet, extended release  -- 1 tab(s) by mouth once a day  -- Indication: For Atrial fibrillation, unspecified type    simvastatin 20 mg oral tablet  -- 1 tab(s) by mouth once a day (at bedtime)  -- Indication: For Hypercholesterolemia    indapamide 1.25 mg oral tablet  -- 1 tab(s) by mouth 2 times a week  -- Indication: For Atrial fibrillation, unspecified type    omeprazole 20 mg oral delayed release tablet  -- 1 tab(s) by mouth once a day  -- Indication: For gerd

## 2017-01-25 NOTE — PROGRESS NOTE ADULT - SUBJECTIVE AND OBJECTIVE BOX
Patient seen and examined bedside resting comfortably. Voiding spontaneously without difficulty.     T(F): 97.8, Max: 98 (01-24 @ 17:53)  HR: 90 (54 - 90)  BP: 119/66 (102/70 - 128/68)  RR: 17 (17 - 18)  SpO2: 98% (94% - 98%)    General: Awake, alert, NAD  Lung: respirations nonlabored  Abdomen: soft, NTND. Normactive BS  Extremities: 1+ pitting edema b/l   : no suprapubic tenderness    LABS:                        13.8   7.9   )-----------( 202      ( 25 Jan 2017 06:17 )             39.3     25 Jan 2017 06:17    146    |  116    |  27     ----------------------------<  96     4.7     |  23     |  0.62     Ca    8.1        25 Jan 2017 06:17    TPro  5.3    /  Alb  1.8    /  TBili  0.6    /  DBili  x      /  AST  14     /  ALT  34     /  AlkPhos  68     24 Jan 2017 07:03    Impression: 89F admitted with weakness, hypokalemia, urinary retention likely 2/2 immobility, PMH HTN, HLD, oral cancer. S/p RRT for respiratory failure with bilateral pleural effusions, started on BIPAP    Plan:  -monitor urine output, renal function  -pt to be d/c'd per medicine to rehab  -continue with all medical management  -will d/w Dr. Arnold

## 2017-01-25 NOTE — DISCHARGE NOTE ADULT - HOSPITAL COURSE
89 year old female with PMH of oral cancer s/p radiation therapy yesterday (has received 5 treatments to date), HTN, HLD and PSH of oral surgery 2/2 to cancer, partial thyroidectomy, and right leg surgery presents to ED complaining of weakness and decreased appetitie. Patient states that due to the radiation she no longer can taste any of her food so she eats very little. She states that she wants nutrition through her veins, she does not want to eat and does not was a NGT for feeding. Admits to 1 episode of diarrhea 2 days ago. Denies fever, chills, sob, chest pain, palpitations, nausea, vomiting, and dysuria. Admits to using a walker for ambulation at home. Patient states she has a home health aid 7 days a week for 6 hours a day, but she lives alone.RRT this morning for hypotension and labored breathing, responded to Bipap and IVF. Currently asymptomatic.    Patient was admitted to telemetry.  Hospital course was complicated by elevated troponins, and rrt for rapid afib.  Patient was hypotensive, received a lot of fluid and then went into acute respiratory failure, she improved with bipap.  A goals of care meeting was held with the family and she was dnr, ultimately she improved and was deemed stable for discharge to [facility] to follow up as an outpatient with cards and her pmd. Plan of care was discussed with patient and family who agreed with plan of care.

## 2017-01-25 NOTE — DISCHARGE NOTE ADULT - NS MD DC FALL RISK RISK
For information on Fall & Injury Prevention, visit [email or web address]

## 2017-01-25 NOTE — DISCHARGE NOTE ADULT - CARE PROVIDERS DIRECT ADDRESSES
[email or web address]

## 2017-01-25 NOTE — DISCHARGE NOTE ADULT - CARE PLAN
Principal Discharge DX:	Acute respiratory failure with hypoxemia  Secondary Diagnosis:	Atrial fibrillation, unspecified type  Secondary Diagnosis:	Mouth cancer  Secondary Diagnosis:	Urinary retention  Secondary Diagnosis:	Hypokalemia  Secondary Diagnosis:	Hypomagnesemia  Secondary Diagnosis:	Essential hypertension Principal Discharge DX:	Acute respiratory failure with hypoxemia  Goal:	daily weights, watch fluid balance closely  Instructions for follow-up, activity and diet:	low sodium diet  Secondary Diagnosis:	Atrial fibrillation, unspecified type  Secondary Diagnosis:	Mouth cancer  Secondary Diagnosis:	Urinary retention  Secondary Diagnosis:	Hypokalemia  Secondary Diagnosis:	Hypomagnesemia  Secondary Diagnosis:	Essential hypertension

## 2017-01-25 NOTE — DISCHARGE NOTE ADULT - CARE PROVIDER_API CALL
Sridhar Perez), Cardiology; Cardiovascular Disease  [address] [place] [address]  Phone: [phone]  Fax: [phone]    Hair Bolivar), Therapeutic Radiology  [address] [place] [address]  Phone: [phone]  Fax: [phone]

## 2017-01-25 NOTE — PROGRESS NOTE ADULT - SUBJECTIVE AND OBJECTIVE BOX
Patient is a 89y old  Female who presents with a chief complaint of weakness and failure to thrive (19 Jan 2017 05:25)      INTERVAL HPI/OVERNIGHT EVENTS:    MEDICATIONS  (STANDING):  heparin  Injectable 5000Unit(s) SubCutaneous every 12 hours  digoxin     Tablet 0.125milliGRAM(s) Oral daily  aspirin enteric coated 325milliGRAM(s) Oral daily  diltiazem    Tablet 30milliGRAM(s) Oral four times a day    MEDICATIONS  (PRN):  nitroglycerin     SubLingual 0.4milliGRAM(s) SubLingual every 15 minutes PRN Chest Pain      Allergies    No Known Allergies    Intolerances        REVIEW OF SYSTEMS:  CONSTITUTIONAL: No fever, weight loss, or fatigue  EYES: No eye pain, visual disturbances, or discharge  ENMT:  No difficulty hearing, tinnitus, vertigo; No sinus or throat pain  NECK: No pain or stiffness  BREASTS: No pain, masses, or nipple discharge  RESPIRATORY: No cough, wheezing, chills or hemoptysis; No shortness of breath  CARDIOVASCULAR: No chest pain, palpitations, dizziness, or leg swelling  GASTROINTESTINAL: No abdominal or epigastric pain. No nausea, vomiting, or hematemesis; No diarrhea or constipation. No melena or hematochezia.  GENITOURINARY: No dysuria, frequency, hematuria, or incontinence  NEUROLOGICAL: No headaches, memory loss, loss of strength, numbness, or tremors  SKIN: No itching, burning, rashes, or lesions   LYMPH NODES: No enlarged glands  ENDOCRINE: No heat or cold intolerance; No hair loss  MUSCULOSKELETAL: No joint pain or swelling; No muscle, back, or extremity pain  PSYCHIATRIC: No depression, anxiety, mood swings, or difficulty sleeping  HEME/LYMPH: No easy bruising, or bleeding gums  ALLERGY AND IMMUNOLOGIC: No hives or eczema    Vital Signs Last 24 Hrs  T(C): 36.6, Max: 37.1 (01-24 @ 13:08)  T(F): 97.8, Max: 98.8 (01-24 @ 13:08)  HR: 90 (54 - 117)  BP: 119/66 (94/65 - 128/68)  BP(mean): --  RR: 17 (17 - 18)  SpO2: 98% (94% - 98%)    PHYSICAL EXAM:  GENERAL: NAD, well-groomed, well-developed  HEAD:  Atraumatic, Normocephalic  EYES: EOMI, PERRLA, conjunctiva and sclera clear  ENMT: No tonsillar erythema, exudates, or enlargement; Moist mucous membranes, Good dentition, No lesions  NECK: Supple, No JVD, Normal thyroid  NERVOUS SYSTEM:  Alert & Oriented X3, Good concentration; Motor Strength 5/5 B/L upper and lower extremities; DTRs 2+ intact and symmetric  CHEST/LUNG: Clear to percussion bilaterally; No rales, rhonchi, wheezing, or rubs  HEART: Regular rate and rhythm; No murmurs, rubs, or gallops  ABDOMEN: Soft, Nontender, Nondistended; Bowel sounds present  EXTREMITIES:  2+ Peripheral Pulses, No clubbing, cyanosis, or edema  LYMPH: No lymphadenopathy noted  SKIN: No rashes or lesions    LABS:                        13.8   7.9   )-----------( 202      ( 25 Jan 2017 06:17 )             39.3     25 Jan 2017 06:17    146    |  116    |  27     ----------------------------<  96     4.7     |  23     |  0.62     Ca    8.1        25 Jan 2017 06:17    TPro  5.3    /  Alb  1.8    /  TBili  0.6    /  DBili  x      /  AST  14     /  ALT  34     /  AlkPhos  68     24 Jan 2017 07:03        CAPILLARY BLOOD GLUCOSE      RADIOLOGY & ADDITIONAL TESTS:    Imaging Personally Reviewed:  [ ] YES  [ ] NO    Consultant(s) Notes Reviewed:  [ ] YES  [ ] NO    Care Discussed with Consultants/Other Providers [ ] YES  [ ] NO Patient is a 89y old  Female who presents with a chief complaint of weakness and failure to thrive (19 Jan 2017 05:25)      INTERVAL HPI/OVERNIGHT EVENTS:  daughter at bedside.  patient complaining about food.  Awake, alert.  Denies chest pain or palpitations.  young removed at 6am.  Patient has voided without difficulty  tte done this am, await result.    MEDICATIONS  (STANDING):  heparin  Injectable 5000Unit(s) SubCutaneous every 12 hours  digoxin     Tablet 0.125milliGRAM(s) Oral daily  aspirin enteric coated 325milliGRAM(s) Oral daily  diltiazem    Tablet 30milliGRAM(s) Oral four times a day    MEDICATIONS  (PRN):  nitroglycerin     SubLingual 0.4milliGRAM(s) SubLingual every 15 minutes PRN Chest Pain      Allergies    No Known Allergies    Intolerances        REVIEW OF SYSTEMS:  CONSTITUTIONAL: No fever, weight loss, or fatigue  EYES: No eye pain, visual disturbances, or discharge  ENMT:  No difficulty hearing, tinnitus, vertigo; notes mouth discomfort, food does not have taste.  NECK: No pain or stiffness  BREASTS: No pain, masses, or nipple discharge  RESPIRATORY: No cough, wheezing, chills or hemoptysis; No shortness of breath  CARDIOVASCULAR: No chest pain, palpitations, dizziness, or leg swelling  GASTROINTESTINAL: No abdominal or epigastric pain. No nausea, vomiting, or hematemesis; No diarrhea or constipation. No melena or hematochezia.  GENITOURINARY: No dysuria, frequency, hematuria, or incontinence  NEUROLOGICAL: No headaches, memory loss, loss of strength, numbness, or tremors  SKIN: No itching, burning, rashes, or lesions   LYMPH NODES: No enlarged glands  ENDOCRINE: No heat or cold intolerance; No hair loss  MUSCULOSKELETAL: No joint pain or swelling; No muscle, back, or extremity pain  PSYCHIATRIC: No depression, anxiety, or mood swings  HEME/LYMPH: No easy bruising, or bleeding gums  ALLERGY AND IMMUNOLOGIC: No hives or eczema    Vital Signs Last 24 Hrs  T(C): 36.6, Max: 37.1 (01-24 @ 13:08)  T(F): 97.8, Max: 98.8 (01-24 @ 13:08)  HR: 90 (54 - 117)  BP: 119/66 (94/65 - 128/68)  BP(mean): --  RR: 17 (17 - 18)  SpO2: 98% (94% - 98%)    PHYSICAL EXAM:  GENERAL: NAD, well-groomed, well-developed  HEAD:  Atraumatic, Normocephalic  EYES: EOMI, PERRLA, conjunctiva and sclera clear  ENMT: No tonsillar erythema, exudates, or enlargement; Moist mucous membranes  NECK: Supple, No JVD  NERVOUS SYSTEM:  Alert & Oriented X3, Good concentration; Motor Strength 5/5 B/L upper and lower extremities; DTRs 2+ intact and symmetric  CHEST/LUNG: Clear to auscultation bilaterally; +kyphosis No rales, rhonchi, wheezing, or rubs  HEART: Regular rate and rhythm; No murmurs, rubs, or gallops  ABDOMEN: Soft, Nontender, Nondistended; Bowel sounds present  EXTREMITIES:  2+ Peripheral Pulses, No clubbing or cyanosis, trace bilateral edema  LYMPH: No lymphadenopathy noted  SKIN: No rashes or lesions    LABS:                        13.8   7.9   )-----------( 202      ( 25 Jan 2017 06:17 )             39.3     25 Jan 2017 06:17    146    |  116    |  27     ----------------------------<  96     4.7     |  23     |  0.62     Ca    8.1        25 Jan 2017 06:17    TPro  5.3    /  Alb  1.8    /  TBili  0.6    /  DBili  x      /  AST  14     /  ALT  34     /  AlkPhos  68     24 Jan 2017 07:03        CAPILLARY BLOOD GLUCOSE      RADIOLOGY & ADDITIONAL TESTS:    Imaging Personally Reviewed:  [x ] YES  [ ] NO    Consultant(s) Notes Reviewed:  [x ] YES  [ ] NO    Care Discussed with Consultants/Other Providers [ ] YES  [ ] NO

## 2017-01-25 NOTE — DISCHARGE NOTE ADULT - SECONDARY DIAGNOSIS.
Atrial fibrillation, unspecified type Mouth cancer Urinary retention Hypokalemia Hypomagnesemia Essential hypertension

## 2017-01-25 NOTE — DISCHARGE NOTE ADULT - MEDICATION SUMMARY - MEDICATIONS TO STOP TAKING
I will STOP taking the medications listed below when I get home from the hospital:    cloNIDine 0.1 mg oral tablet  -- 1 tab(s) by mouth 3 times a day    valsartan 320 mg oral tablet  -- 1 tab(s) by mouth once a day

## 2017-01-25 NOTE — PROGRESS NOTE ADULT - SUBJECTIVE AND OBJECTIVE BOX
Pt appears to be able to eat - do not think she would benefit from peg      MEDICATIONS  (STANDING):  heparin  Injectable 5000Unit(s) SubCutaneous every 12 hours  digoxin     Tablet 0.125milliGRAM(s) Oral daily  aspirin enteric coated 325milliGRAM(s) Oral daily  diltiazem    Tablet 30milliGRAM(s) Oral four times a day    MEDICATIONS  (PRN):  nitroglycerin     SubLingual 0.4milliGRAM(s) SubLingual every 15 minutes PRN Chest Pain      Allergies    No Known Allergies    Intolerances        Vital Signs Last 24 Hrs  T(C): 36.1, Max: 37.1 (01-24 @ 13:08)  T(F): 97, Max: 98.8 (01-24 @ 13:08)  HR: 83 (54 - 117)  BP: 128/68 (94/65 - 128/68)  BP(mean): --  RR: 17 (17 - 18)  SpO2: 96% (94% - 98%)    PHYSICAL EXAM:  General: NAD.  CVS: S1, S2  Chest: air entry bilaterally present  Abd: BS present, soft, non-tender      LABS:                        13.8   7.9   )-----------( 202      ( 25 Jan 2017 06:17 )             39.3     25 Jan 2017 06:17    146    |  116    |  27     ----------------------------<  96     4.7     |  23     |  0.62     Ca    8.1        25 Jan 2017 06:17    TPro  5.3    /  Alb  1.8    /  TBili  0.6    /  DBili  x      /  AST  14     /  ALT  34     /  AlkPhos  68     24 Jan 2017 07:03        Encourage PO feedings  Labs stable

## 2017-01-25 NOTE — DISCHARGE NOTE ADULT - MEDICATION SUMMARY - MEDICATIONS TO CHANGE
I will SWITCH the dose or number of times a day I take the medications listed below when I get home from the hospital:    diltiaZEM 240 mg/24 hours oral tablet, extended release  -- 1 tab(s) by mouth once a day

## 2017-01-25 NOTE — DISCHARGE NOTE ADULT - PATIENT PORTAL LINK FT
“You can access the [facility] Patient Portal, offered by [facility], by registering with the following website: [email or web address]”

## 2017-01-26 VITALS
SYSTOLIC BLOOD PRESSURE: 101 MMHG | RESPIRATION RATE: 17 BRPM | OXYGEN SATURATION: 97 % | DIASTOLIC BLOOD PRESSURE: 60 MMHG | HEART RATE: 89 BPM | TEMPERATURE: 98 F

## 2017-01-26 DIAGNOSIS — I50.43 ACUTE ON CHRONIC COMBINED SYSTOLIC (CONGESTIVE) AND DIASTOLIC (CONGESTIVE) HEART FAILURE: ICD-10-CM

## 2017-01-26 PROCEDURE — 99239 HOSP IP/OBS DSCHRG MGMT >30: CPT

## 2017-01-26 RX ORDER — PANTOPRAZOLE SODIUM 20 MG/1
40 TABLET, DELAYED RELEASE ORAL
Qty: 0 | Refills: 0 | Status: DISCONTINUED | OUTPATIENT
Start: 2017-01-26 | End: 2017-01-26

## 2017-01-26 RX ADMIN — Medication 0.12 MILLIGRAM(S): at 06:48

## 2017-01-26 RX ADMIN — Medication 325 MILLIGRAM(S): at 12:20

## 2017-01-26 RX ADMIN — PANTOPRAZOLE SODIUM 40 MILLIGRAM(S): 20 TABLET, DELAYED RELEASE ORAL at 15:15

## 2017-01-26 RX ADMIN — HEPARIN SODIUM 5000 UNIT(S): 5000 INJECTION INTRAVENOUS; SUBCUTANEOUS at 06:49

## 2017-01-26 NOTE — PROGRESS NOTE ADULT - PROBLEM SELECTOR PLAN 1
afib is now rate controlled on cardizem 30 mg po q 6h.   tte done global cardiomyopathy and severe AS

## 2017-01-26 NOTE — PROGRESS NOTE ADULT - PROBLEM SELECTOR PLAN 8
rule out mi.  likely secondary to rapid afib  f/u tte  cards input appreciated
rule out mi.  likely secondary to rapid afib  f/u tte  cards input appreciated
rule out mi.  likely secondary to rapid afib  check tte  cards input appreciated

## 2017-01-26 NOTE — PROGRESS NOTE ADULT - PROBLEM SELECTOR PROBLEM 5
Urinary retention
Urinary retention
Atrial fibrillation, unspecified type
Essential hypertension
Hypomagnesemia
Urinary retention

## 2017-01-26 NOTE — PROGRESS NOTE ADULT - PROBLEM SELECTOR PROBLEM 1
Afib
Cardiac ischemia
Dysphagia, unspecified type
Dysphagia, unspecified type
Tachycardia
Urinary retention

## 2017-01-26 NOTE — PROGRESS NOTE ADULT - SUBJECTIVE AND OBJECTIVE BOX
Patient is a 89y old  Female who presents with a chief complaint of weakness and failure to thrive (25 Jan 2017 14:15)      INTERVAL HPI/OVERNIGHT EVENTS:    MEDICATIONS  (STANDING):  heparin  Injectable 5000Unit(s) SubCutaneous every 12 hours  digoxin     Tablet 0.125milliGRAM(s) Oral daily  aspirin enteric coated 325milliGRAM(s) Oral daily  diltiazem    Tablet 30milliGRAM(s) Oral four times a day    MEDICATIONS  (PRN):  nitroglycerin     SubLingual 0.4milliGRAM(s) SubLingual every 15 minutes PRN Chest Pain      Allergies    No Known Allergies    Intolerances        REVIEW OF SYSTEMS:  CONSTITUTIONAL: No fever, weight loss, or fatigue  EYES: No eye pain, visual disturbances, or discharge  ENMT:  No difficulty hearing, tinnitus, vertigo; No sinus or throat pain  NECK: No pain or stiffness  BREASTS: No pain, masses, or nipple discharge  RESPIRATORY: No cough, wheezing, chills or hemoptysis; No shortness of breath  CARDIOVASCULAR: No chest pain, palpitations, dizziness, or leg swelling  GASTROINTESTINAL: No abdominal or epigastric pain. No nausea, vomiting, or hematemesis; No diarrhea or constipation. No melena or hematochezia.  GENITOURINARY: No dysuria, frequency, hematuria, or incontinence  NEUROLOGICAL: No headaches, memory loss, loss of strength, numbness, or tremors  SKIN: No itching, burning, rashes, or lesions   LYMPH NODES: No enlarged glands  ENDOCRINE: No heat or cold intolerance; No hair loss  MUSCULOSKELETAL: No joint pain or swelling; No muscle, back, or extremity pain  PSYCHIATRIC: No depression, anxiety, mood swings, or difficulty sleeping  HEME/LYMPH: No easy bruising, or bleeding gums  ALLERGY AND IMMUNOLOGIC: No hives or eczema    Vital Signs Last 24 Hrs  T(C): 36.4, Max: 36.7 (01-26 @ 00:27)  T(F): 97.6, Max: 98.1 (01-26 @ 00:27)  HR: 89 (75 - 92)  BP: 101/60 (101/60 - 117/67)  BP(mean): --  RR: 17 (17 - 20)  SpO2: 97% (96% - 98%)    PHYSICAL EXAM:  GENERAL: NAD, well-groomed, well-developed  HEAD:  Atraumatic, Normocephalic  EYES: EOMI, PERRLA, conjunctiva and sclera clear  ENMT: No tonsillar erythema, exudates, or enlargement; Moist mucous membranes, Good dentition, No lesions  NECK: Supple, No JVD, Normal thyroid  NERVOUS SYSTEM:  Alert & Oriented X3, Good concentration; Motor Strength 5/5 B/L upper and lower extremities; DTRs 2+ intact and symmetric  CHEST/LUNG: Clear to percussion bilaterally; No rales, rhonchi, wheezing, or rubs  HEART: Regular rate and rhythm; No murmurs, rubs, or gallops  ABDOMEN: Soft, Nontender, Nondistended; Bowel sounds present  EXTREMITIES:  2+ Peripheral Pulses, No clubbing, cyanosis, or edema  LYMPH: No lymphadenopathy noted  SKIN: No rashes or lesions    LABS:                        13.8   7.9   )-----------( 202      ( 25 Jan 2017 06:17 )             39.3     25 Jan 2017 06:17    146    |  116    |  27     ----------------------------<  96     4.7     |  23     |  0.62     Ca    8.1        25 Jan 2017 06:17          CAPILLARY BLOOD GLUCOSE      RADIOLOGY & ADDITIONAL TESTS:    Imaging Personally Reviewed:  [ ] YES  [ ] NO    Consultant(s) Notes Reviewed:  [ ] YES  [ ] NO    Care Discussed with Consultants/Other Providers [ ] YES  [ ] NO Patient is a 89y old  Female who presents with a chief complaint of weakness and failure to thrive (25 Jan 2017 14:15)      INTERVAL HPI/OVERNIGHT EVENTS:  patient voiding without difficulty.  No new compliants.  Food still does not have much taste.    MEDICATIONS  (STANDING):  heparin  Injectable 5000Unit(s) SubCutaneous every 12 hours  digoxin     Tablet 0.125milliGRAM(s) Oral daily  aspirin enteric coated 325milliGRAM(s) Oral daily  diltiazem    Tablet 30milliGRAM(s) Oral four times a day    MEDICATIONS  (PRN):  nitroglycerin     SubLingual 0.4milliGRAM(s) SubLingual every 15 minutes PRN Chest Pain      Allergies    No Known Allergies    Intolerances        REVIEW OF SYSTEMS:  CONSTITUTIONAL: No fever, weight loss, or fatigue  EYES: No eye pain, visual disturbances, or discharge  ENMT:  No difficulty hearing, tinnitus, vertigo; notes mouth discomfort, food does not have taste.  NECK: No pain or stiffness  BREASTS: No pain, masses, or nipple discharge  RESPIRATORY: No cough, wheezing, chills or hemoptysis; No shortness of breath  CARDIOVASCULAR: No chest pain, palpitations, dizziness, or leg swelling  GASTROINTESTINAL: No abdominal or epigastric pain. No nausea, vomiting, or hematemesis; No diarrhea or constipation. No melena or hematochezia.  GENITOURINARY: No dysuria, frequency, hematuria, or incontinence  NEUROLOGICAL: No headaches, memory loss, loss of strength, numbness, or tremors  SKIN: No itching, burning, rashes, or lesions   LYMPH NODES: No enlarged glands  ENDOCRINE: No heat or cold intolerance; No hair loss  MUSCULOSKELETAL: No joint pain or swelling; No muscle, back, or extremity pain  PSYCHIATRIC: No depression, anxiety, or mood swings  HEME/LYMPH: No easy bruising, or bleeding gums  ALLERGY AND IMMUNOLOGIC: No hives or eczema    Vital Signs Last 24 Hrs  T(C): 36.4, Max: 36.7 (01-26 @ 00:27)  T(F): 97.6, Max: 98.1 (01-26 @ 00:27)  HR: 89 (75 - 92)  BP: 101/60 (101/60 - 117/67)  BP(mean): --  RR: 17 (17 - 20)  SpO2: 97% (96% - 98%)    PHYSICAL EXAM:  GENERAL: NAD, well-groomed, well-developed  HEAD:  Atraumatic, Normocephalic  EYES: EOMI, PERRLA, conjunctiva and sclera clear  ENMT: No tonsillar erythema, exudates, or enlargement; Moist mucous membranes  NECK: Supple, No JVD  NERVOUS SYSTEM:  Alert & Oriented X3, Good concentration; Motor Strength 5/5 B/L upper and lower extremities; DTRs 2+ intact and symmetric  CHEST/LUNG: Clear to auscultation bilaterally; +kyphosis No rales, rhonchi, wheezing, or rubs  HEART: Regular rate and rhythm; No murmurs, rubs, or gallops  ABDOMEN: Soft, Nontender, Nondistended; Bowel sounds present  EXTREMITIES:  2+ Peripheral Pulses, No clubbing or cyanosis, trace bilateral edema  LYMPH: No lymphadenopathy noted  SKIN: No rashes or lesions    LABS:                        13.8   7.9   )-----------( 202      ( 25 Jan 2017 06:17 )             39.3     25 Jan 2017 06:17    146    |  116    |  27     ----------------------------<  96     4.7     |  23     |  0.62     Ca    8.1        25 Jan 2017 06:17  Summary:   1. Left ventricular ejection fraction, by visual estimation, is 35 to   40%.   2. Technically fair study.   3. Moderately decreased global left ventricular systolic function.   4. Global cardiomyopathy.   5. Normal left ventricular internal cavity size.   6. The mitral in-flow pattern reveals no discernable A-wave, therefore   no comment on diastolic function can be made.   7. There is borderline concentric left ventricular hypertrophy.   8. Akinetic distal septal and apical walls.   9. Mildly dilated right atrium.  10. Large pleural effusion in the left lateral region.  11. Trivial pericardial effusion.  12. Mild mitral annular calcification.  13. Mild mitral valve regurgitation.  14. Thickening and calcification of the anterior and posterior mitral   valve leaflets.  15. Degenerative tricuspid valve.  16. Mild aortic regurgitation.  17. Estimated pulmonary artery systolic pressure is 41.8 mmHg assuming a   right atrial pressure of 5 mmHg, which is consistent with mild pulmonary   hypertension.  18. Peak transaortic gradient equals 16.4 mmHg, mean transaortic gradient   equals 9.4 mmHg, the calculated aortic valve area equals 0.93 cm² by the   continuity equation consistent with severe aortic stenosis.  19. There is mild aortic root calcification.            CAPILLARY BLOOD GLUCOSE      RADIOLOGY & ADDITIONAL TESTS:    Imaging Personally Reviewed:  [ ] YES  [ ] NO    Consultant(s) Notes Reviewed:  [ ] YES  [ ] NO    Care Discussed with Consultants/Other Providers [ ] YES  [ ] NO

## 2017-01-26 NOTE — PROGRESS NOTE ADULT - PROBLEM SELECTOR PLAN 4
s/p rt  encourage po intake  I discussed plan of care with patient's radiation oncologist Dr. Hair Bolivar MD  cell 
s/p rt  encourage po intake  I discussed plan of care with patient's radiation oncologist Dr. Hair Bolivar MD  cell 
s/p rt  encourage po intake

## 2017-01-26 NOTE — PROGRESS NOTE ADULT - ASSESSMENT
89 year old female with Elevated cardiac enzymes, not likely acute coronary insufficiency. Atrial fibrillation and failure to thrive.    Plan:        Discontinue telemetry monitoring. Rate adequate, not candidate for aggressive or invasive  diagnostic treatments. Treat medically.   Change diltiazem to long acting formulation.  Not a good A/C candidate.  Hospice eval if refusing PO intake?
89 year old female with PMH of oral cancer s/p multiple surgeries and currently undergoing radiation therapy,  HTN, HLD admitted with adult failure to thrive.  Hospital course complicated by rapid afib, hypotension, urinary retention and acute respiratory failure.  A lengthly family meeting was held Jan 23 with the grandaughter in person, and via facetime with the patient's daughter and son in law.  The patient is currently DNR and had an episode of rapid afib yesterday
Acute Urinary retention, most likely secondary to immobility. Continue Lyons Catheter
89 year old female with PMH of oral cancer s/p multiple surgeries and currently undergoing radiation therapy,  HTN, HLD admitted with adult failure to thrive.  Hospital course complicated by rapid afib, hypotension, urinary retention and acute respiratory failure.  A lengthly family meeting was held Jan 23 with the grandaughter in person, and via facetime with the patient's daughter and son in law.  The patient is currently DNR and had an episode of rapid afib 2 days ago;  TTE reveals severe AS and ef 35% consistent with acute on chronic combined systolic and diastolic heart failure.
89 year old female with PMH of oral cancer s/p multiple surgeries and currently undergoing radiation therapy,  HTN, HLD andadmitted with adult failure to thrive.  Hospital course complicated by rapid afib, hypotension, urinary retention and acute respiratory failure.  A lengthly family meeting was held yesterday with the grandaughter in person, and via facetime with the patient's daughter and son in law.  The patient is currently DNR and had an episode of rapid afib early this am

## 2017-01-26 NOTE — PROGRESS NOTE ADULT - PROBLEM SELECTOR PLAN 7
secondary to (4)  encourage po intake

## 2017-01-26 NOTE — PROGRESS NOTE ADULT - PROBLEM SELECTOR PROBLEM 6
Failure to thrive in adult
Hypotension, unspecified hypotension type
Mouth cancer

## 2017-01-26 NOTE — PROGRESS NOTE ADULT - ATTENDING COMMENTS
plan of care discussed with patient and daughter (in person) who agree with plan of care  wish is to go to [facility] in [facility].
d/c to [facility].  Plan is to transfer to facility in [place] once daughter finds acceptable facitlity  greater than 30 minutes on discharge
patient is dnr.  Goal is to get patient to [facility].  Plan of care discussed with patient and PMD DR. HILARIO WALKER

## 2017-01-26 NOTE — PROGRESS NOTE ADULT - PROVIDER SPECIALTY LIST ADULT
Cardiology
Gastroenterology
Hospitalist
Surgery
Urology
Hospitalist

## 2017-01-26 NOTE — PROGRESS NOTE ADULT - PROBLEM SELECTOR PLAN 3
patient is doing much better on nasal cannula and room air  check pulse ox on room air
patient is doing much better on nasal cannula and room air  check pulse ox on room air
B.P. 92/70     IVF    NS    250   ml    bolus    over    30   mins  , repeat   B.P.
titrate off bipap as tolerated, patient is doing much better

## 2017-01-26 NOTE — PROGRESS NOTE ADULT - PROBLEM SELECTOR PROBLEM 3
Acute respiratory failure with hypoxemia
Anorexia
Anorexia
Failure to thrive in adult
Hypotension

## 2017-01-26 NOTE — PROGRESS NOTE ADULT - SUBJECTIVE AND OBJECTIVE BOX
Patient seen and examined bedside resting comfortably.  No complaints offered. Lyons catheter was removed yesterday. Pt has no urinary complaints and reports she is occasionally incontinent, voids "small amounts frequently"  Voiding spontaenously without difficulty.  Denies hematuria and dysuria.  Denies nausea and vomiting. Tolerating diet although she   Denies chest pain, dyspnea, cough.  T(F): 97.9, Max: 98.1 (01-26 @ 00:27)  HR: 92 (75 - 92)  BP: 110/80 (104/70 - 119/66)  RR: 20 (17 - 20)  SpO2: 96% (96% - 98%)    PHYSICAL EXAM:  General: NAD, WDWN  Neuro:  Alert & oriented x 3  HEENT: NCAT, EOMI, conjunctiva clear  CV: +S1S2 regular rate and rhythm  Lung: clear to ausculation bilaterally, respirations nonlabored, good inspiratory effort  Abdomen: soft, NTND. Normactive BS  Extremities: no pedal edema or calf tenderness noted   : no suprapubic tenderness. Bladder not palpable    LABS:  no new labs today      I&O's 480/100cc (recorded)    Impression: 89y Female admitted with FAILURE TO THRIVE IN ADULT AND HYPOKALEMIA  GENERAL WEAKNESS, WITH NAUSEA    PMH   Hyperlipidemia  HTN (hypertension)  Mouth cancer      Plan:  -continue present medical management, increase activity and OOB with PT  -continue VTE prophylaxis  -will discuss with Dr Arnold for further recommendations Patient seen and examined bedside resting comfortably.  No complaints offered. Young catheter was removed yesterday. Pt has no urinary complaints and reports she is occasionally incontinent, voids "small amounts frequently"  Denies hematuria and dysuria.  Denies nausea and vomiting. Tolerating diet although she reports decreased appetite due to difficulty tasting food due to oral ca tx.  Denies chest pain, dyspnea, cough.    T(F): 97.9, Max: 98.1 (01-26 @ 00:27)  HR: 92 (75 - 92)  BP: 110/80 (104/70 - 119/66)  RR: 20 (17 - 20)  SpO2: 96% (96% - 98%)    PHYSICAL EXAM:  General: NAD, WDWN  Neuro:  Alert & oriented  HEENT: NCAT, EOMI, conjunctiva clear  CV: +S1S2 regular rate and rhythm  Lung: clear to ausculation bilaterally, respirations nonlabored, good inspiratory effort  Abdomen: soft, NTND. Normactive BS  Extremities: trace pedal edema b/l  : no suprapubic tenderness. Bladder not palpable    LABS:  no new labs today      I&O's 480/100cc (recorded)    Impression: 89F admitted with weakness, hypokalemia, urinary retention likely 2/2 immobility, now voiding s/p young removal. PMH HTN, HLD, oral cancer.     Plan:  -monitor urine output, renal function  -pt to be d/c'd per medicine to rehab; continue with all medical management  -ensure added to diet  -will d/w Dr. Arnold

## 2017-01-26 NOTE — PROGRESS NOTE ADULT - PROBLEM SELECTOR PLAN 5
patient voiding without issue s/p d/c young (resolved)
patient voiding without issue s/p d/c young (resolved)
d/c young at 6 am (patient requests young at this point)

## 2017-01-26 NOTE — PROGRESS NOTE ADULT - SUBJECTIVE AND OBJECTIVE BOX
No significant changes - pt eats when she wants to; no specific dysphagia    MEDICATIONS  (STANDING):  heparin  Injectable 5000Unit(s) SubCutaneous every 12 hours  digoxin     Tablet 0.125milliGRAM(s) Oral daily  aspirin enteric coated 325milliGRAM(s) Oral daily  diltiazem    Tablet 30milliGRAM(s) Oral four times a day  pantoprazole    Tablet 40milliGRAM(s) Oral before breakfast    MEDICATIONS  (PRN):  nitroglycerin     SubLingual 0.4milliGRAM(s) SubLingual every 15 minutes PRN Chest Pain      Allergies    No Known Allergies    Intolerances        Vital Signs Last 24 Hrs  T(C): 36.4, Max: 36.7 (01-26 @ 00:27)  T(F): 97.6, Max: 98.1 (01-26 @ 00:27)  HR: 89 (75 - 92)  BP: 101/60 (101/60 - 117/67)  BP(mean): --  RR: 17 (17 - 20)  SpO2: 97% (96% - 98%)    PHYSICAL EXAM:  General: NAD.  CVS: S1, S2  Chest: air entry bilaterally present  Abd: BS present, soft, non-tender      LABS:                        13.8   7.9   )-----------( 202      ( 25 Jan 2017 06:17 )             39.3     25 Jan 2017 06:17    146    |  116    |  27     ----------------------------<  96     4.7     |  23     |  0.62     Ca    8.1        25 Jan 2017 06:17      Encourage diet  discharge planning

## 2017-01-29 LAB
CULTURE RESULTS: SIGNIFICANT CHANGE UP
CULTURE RESULTS: SIGNIFICANT CHANGE UP
SPECIMEN SOURCE: SIGNIFICANT CHANGE UP
SPECIMEN SOURCE: SIGNIFICANT CHANGE UP

## 2017-01-30 DIAGNOSIS — E78.5 HYPERLIPIDEMIA, UNSPECIFIED: ICD-10-CM

## 2017-01-30 DIAGNOSIS — I48.91 UNSPECIFIED ATRIAL FIBRILLATION: ICD-10-CM

## 2017-01-30 DIAGNOSIS — E87.6 HYPOKALEMIA: ICD-10-CM

## 2017-01-30 DIAGNOSIS — C06.9 MALIGNANT NEOPLASM OF MOUTH, UNSPECIFIED: ICD-10-CM

## 2017-01-30 DIAGNOSIS — R62.7 ADULT FAILURE TO THRIVE: ICD-10-CM

## 2017-01-30 DIAGNOSIS — R53.1 WEAKNESS: ICD-10-CM

## 2017-01-30 DIAGNOSIS — E46 UNSPECIFIED PROTEIN-CALORIE MALNUTRITION: ICD-10-CM

## 2017-01-30 DIAGNOSIS — R63.0 ANOREXIA: ICD-10-CM

## 2017-01-30 DIAGNOSIS — R33.9 RETENTION OF URINE, UNSPECIFIED: ICD-10-CM

## 2017-01-30 DIAGNOSIS — E83.42 HYPOMAGNESEMIA: ICD-10-CM

## 2017-01-30 DIAGNOSIS — I25.9 CHRONIC ISCHEMIC HEART DISEASE, UNSPECIFIED: ICD-10-CM

## 2017-01-30 DIAGNOSIS — I10 ESSENTIAL (PRIMARY) HYPERTENSION: ICD-10-CM

## 2017-01-30 DIAGNOSIS — J96.01 ACUTE RESPIRATORY FAILURE WITH HYPOXIA: ICD-10-CM

## 2017-02-14 ENCOUNTER — APPOINTMENT (OUTPATIENT)
Dept: RADIATION ONCOLOGY | Facility: CLINIC | Age: 82
End: 2017-02-14

## 2017-11-21 RX ORDER — VALSARTAN 80 MG/1
1 TABLET ORAL
Qty: 0 | Refills: 0 | COMMUNITY

## 2017-11-21 RX ORDER — SIMVASTATIN 20 MG/1
1 TABLET, FILM COATED ORAL
Qty: 0 | Refills: 0 | COMMUNITY

## 2017-11-21 RX ORDER — ASPIRIN/CALCIUM CARB/MAGNESIUM 324 MG
1 TABLET ORAL
Qty: 0 | Refills: 0 | COMMUNITY

## 2017-11-21 RX ORDER — INDAPAMIDE 1.25 MG
1 TABLET ORAL
Qty: 0 | Refills: 0 | COMMUNITY

## 2017-11-21 RX ORDER — OMEPRAZOLE 10 MG/1
1 CAPSULE, DELAYED RELEASE ORAL
Qty: 0 | Refills: 0 | COMMUNITY

## 2019-11-25 NOTE — SWALLOW BEDSIDE ASSESSMENT ADULT - ASR SWALLOW DENTITION
Patient has been seeing Dr. Louis, and has not seen Dr. Ricky Serrato. Please advsie thank you   incomplete

## 2020-09-28 NOTE — ED ADULT TRIAGE NOTE - NS ED NURSE BANDS TYPE
Name band; Post-Care Instructions: I reviewed with the patient in detail post-care instructions. Patient is not to engage in any heavy lifting, exercise, or swimming for the next 14 days. Should the patient develop any fevers, chills, bleeding, severe pain patient will contact the office immediately.

## 2020-10-02 NOTE — H&P ADULT. - BREASTS
Occupational Therapy  Visit Type: treatment  Precautions:  Medical precautions:  fall risk; standard precautions.  PPE worn during session: Droplet mask and goggles worn throughout    Lines:     Basic: capped IV and telemetry      Lines in chart and on patient reviewed, cautions maintained throughout session.  Safety Measures: Alarms not activated at start of session.    SUBJECTIVE                                                                                                            Patient agreed to participate in therapy this date.  \"I want to try.\"      OBJECTIVE                                                                                                              Level of consciousness: alert    Oriented to person, place, time and situation     Arousal alertness: appropriate responses to stimuli    Affect/Behavior: alert, cooperative, appropriate and pleasant  Patient activity tolerance: 1 to 2 activity to rest  Functional Communication/Cognition    Overall status:  Impaired    Form of communication:  Verbal   Attention span:  Attends with cues to redirect    Commands: follows one step commands with increased time and follows one step commands with repetition.    Transition between tasks: transition with cues.    Safety judgement: decreased awareness of need for assistance and decreased awareness of need for safety.    Awareness of deficits: not aware of deficits.  Bed mobility:    Rolling left: total assist - dependent    Rolling right: total assist - dependent    Repositioning in bed: total assist - dependent    Supine to sit: total assist - dependent    Sit to supine: total assist - dependent  Transfers:    Assistive devices: non-mechanical sit to stand lift and 2 person    Sit to stand: total assist - dependent    Stand to sit: total assist - dependent  Functional Ambulation:    Assistance:total assist - dependent   Assistive device:2 person  Activities of Daily Living (ADLs):  Toilet transfer:      Assist: total assist - dependent    Device: non-mechanical sit to stand lift    Equipment: grab bar use and commode over toilet  Toileting:     Assist: total assist - dependent    Assist needed for: increased time to complete, verbal cueing, perineal hygiene, supervision/safety and set up    Equipment: commode over toilet             ASSESSMENT                                                                                                                Impairments: activity tolerance, aerobic capacity, balance, bed mobility, coordination/proprioception, communication, cardiovascular endurance, decreased insight into deficits, range of motion, safety awareness, strength, emotional/psychological, pain, desire/motivation, sensation and skin integrity  Functional Limitations: bed mobility, functional transfers, community reintegration, IADLs, job performance, participating in meaningful/purposeful activities, functional mobility, dressing, showering, eating, grooming, bathing and toileting    Patient seen on [facility] nursing unit.     Occupational therapy treatment session today focused on functional transfers to toilet with florentino dody non mechanical lift and two person assist for safety. Pt very limited by rain in right hip region. Pt was total assist for anshu cares following small BM unable to complete trunk rotation for hygiene.  Patient is currently functioning at total assist for functional transfers and total assist for toilet transfers.      Recommended Consults: OT: Pain specialist  Discharge Recommendations:  Recommendations for Discharge: OT WI: Intensive therapy program      PT/OT Mobility Equipment for Discharge: no equipment needs, patient has 4ww  PT/OT ADL Equipment for Discharge: Patient will need a bedside commode, tub transfer bench, and possible LE adaptive ADL equipment  OT Identified Barriers to Discharge: right LE pain, OA      Skilled therapy is required to address these limitations in attempt to  Normal vision: sees adequately in most situations; can see medication labels, newsprint maximize the patient's independence.  Progress: slow progress, decreased activity tolerance    End of Session:   Location: in chair  Safety measures: call light within reach  Handoff to: nurse assistant    PLAN                                                                                                                          Suggestions for next session as indicated: COTX Continue functional transfers, florentino broussard, ADLs seated, toilet transfers, toileting   OT Frequency: 5 days/week  Frequency Comments: HF,AH COTX          GOALS:  Long Term Goals: (to be met by time of discharge from hospital)  Grooming: Patient will complete grooming tasks in sitting modified independent.  Upper body dressing: Patient will complete upper body dressing in sitting modified independent.  Lower body dressing: Patient will complete lower body dressing in sitting moderate assist.  Toileting: Patient will complete toileting maximal assist and with minimal cues.  Bathing: Patient will complete bathing edge of bed, chair, sitting at sink and standing at sink using tub transfer bench, hand held shower, grab bar and long handled sponge, moderate assist Toilet transfer: Patient will complete toilet transfer with 2-wheeled walker, moderate assist.   Tub/shower transfer: Patient will complete tub/shower transfer with tub bench, moderate assist.   Home setting transfer: Patient will complete home setting transfers with 2-wheeled walker, moderate assist.         Documented in the chart in the following areas: Assessment. Plan.       detailed exam

## 2024-01-16 NOTE — SWALLOW BEDSIDE ASSESSMENT ADULT - SLP PATIENT PROFILE REVIEW
No answer, left detailed voice message for patient with information stated below.     -Patient would like communication of their results via:      Cell Phone:       Telephone Information:   Mobile [phone]     Okay to leave a message containing results? Yes   yes